# Patient Record
Sex: FEMALE | Race: WHITE | Employment: OTHER | URBAN - METROPOLITAN AREA
[De-identification: names, ages, dates, MRNs, and addresses within clinical notes are randomized per-mention and may not be internally consistent; named-entity substitution may affect disease eponyms.]

---

## 2021-04-12 ENCOUNTER — OFFICE VISIT (OUTPATIENT)
Dept: PRIMARY CARE CLINIC | Age: 71
End: 2021-04-12
Payer: MEDICARE

## 2021-04-12 VITALS
SYSTOLIC BLOOD PRESSURE: 152 MMHG | HEART RATE: 78 BPM | HEIGHT: 61 IN | OXYGEN SATURATION: 96 % | WEIGHT: 141.6 LBS | DIASTOLIC BLOOD PRESSURE: 82 MMHG | BODY MASS INDEX: 26.73 KG/M2

## 2021-04-12 DIAGNOSIS — E78.5 HYPERLIPIDEMIA, UNSPECIFIED HYPERLIPIDEMIA TYPE: ICD-10-CM

## 2021-04-12 DIAGNOSIS — I10 ESSENTIAL HYPERTENSION: Primary | ICD-10-CM

## 2021-04-12 DIAGNOSIS — R73.03 PREDIABETES: ICD-10-CM

## 2021-04-12 DIAGNOSIS — N18.2 STAGE 2 CHRONIC KIDNEY DISEASE: ICD-10-CM

## 2021-04-12 DIAGNOSIS — Z12.31 ENCOUNTER FOR SCREENING MAMMOGRAM FOR MALIGNANT NEOPLASM OF BREAST: ICD-10-CM

## 2021-04-12 PROCEDURE — 99204 OFFICE O/P NEW MOD 45 MIN: CPT | Performed by: FAMILY MEDICINE

## 2021-04-12 RX ORDER — ASPIRIN 81 MG/1
81 TABLET ORAL DAILY
COMMUNITY

## 2021-04-12 RX ORDER — TRETINOIN 0.5 MG/G
CREAM TOPICAL NIGHTLY
COMMUNITY

## 2021-04-12 RX ORDER — AMPICILLIN TRIHYDRATE 250 MG
CAPSULE ORAL
COMMUNITY

## 2021-04-12 RX ORDER — LATANOPROST 50 UG/ML
1 SOLUTION/ DROPS OPHTHALMIC NIGHTLY
COMMUNITY

## 2021-04-12 RX ORDER — OLMESARTAN MEDOXOMIL 5 MG/1
5 TABLET ORAL DAILY
Qty: 30 TABLET | Refills: 5
Start: 2021-04-12

## 2021-04-12 RX ORDER — ROSUVASTATIN CALCIUM 40 MG/1
40 TABLET, COATED ORAL EVERY EVENING
COMMUNITY
End: 2022-01-03 | Stop reason: SDUPTHER

## 2021-04-12 RX ORDER — MULTIVIT-MIN/IRON/FOLIC ACID/K 18-600-40
CAPSULE ORAL
COMMUNITY

## 2021-04-12 RX ORDER — TRIAMCINOLONE ACETONIDE 55 UG/1
2 SPRAY, METERED NASAL DAILY
COMMUNITY

## 2021-04-12 SDOH — ECONOMIC STABILITY: TRANSPORTATION INSECURITY
IN THE PAST 12 MONTHS, HAS LACK OF TRANSPORTATION KEPT YOU FROM MEETINGS, WORK, OR FROM GETTING THINGS NEEDED FOR DAILY LIVING?: NO

## 2021-04-12 SDOH — ECONOMIC STABILITY: FOOD INSECURITY: WITHIN THE PAST 12 MONTHS, THE FOOD YOU BOUGHT JUST DIDN'T LAST AND YOU DIDN'T HAVE MONEY TO GET MORE.: NEVER TRUE

## 2021-04-12 SDOH — ECONOMIC STABILITY: TRANSPORTATION INSECURITY
IN THE PAST 12 MONTHS, HAS THE LACK OF TRANSPORTATION KEPT YOU FROM MEDICAL APPOINTMENTS OR FROM GETTING MEDICATIONS?: NO

## 2021-04-12 ASSESSMENT — ENCOUNTER SYMPTOMS
CHEST TIGHTNESS: 0
ABDOMINAL PAIN: 0
VOMITING: 0
SHORTNESS OF BREATH: 0
DIARRHEA: 0
SORE THROAT: 0
NAUSEA: 0
CONSTIPATION: 0

## 2021-04-12 ASSESSMENT — PATIENT HEALTH QUESTIONNAIRE - PHQ9
SUM OF ALL RESPONSES TO PHQ QUESTIONS 1-9: 0
SUM OF ALL RESPONSES TO PHQ9 QUESTIONS 1 & 2: 0

## 2021-04-12 NOTE — PROGRESS NOTES
[facility] PRIMARY CARE  [address]   [address]  [address] [address]  Dept: [phone]  Dept Fax: [phone]    Malathi David is a 72 y.o. female who presents today for her medical conditions/complaints as noted below. Malathi David is c/o of  Chief Complaint   Patient presents with    Establish Care         HPI:     HPI    Pt here to establish care with new pcp today. BP is elevated today, she states notes it has been elevated since her eye surgery on Thursday last week. She is on steroid eye drops and eye antibiotics as well. She is on olmesartan 2.5 mg but does note when bp elevated to take 5 mg per his notes. She has been taking only 2.5 mg. Shes sees Dr. Yamile Phipps for CKD . States her kidney function has been stable. She avoids nsaids. Eating healthy does her own cooking at home, avoids too much salt. Seen by urology- had cystoscopy. Has prolapse as well. She also has hyperlipidemia and is on a statin, doing well on current medication. No results found for: LABA1C          ( goal A1C is < 7)   No results found for: LABMICR  No results found for: LDLCHOLESTEROL, LDLCALC    (goal LDL is <100)   No results found for: AST, ALT, BUN  BP Readings from Last 3 Encounters:   04/12/21 (!) 152/82          (goal 120/80)    History reviewed. No pertinent past medical history. History reviewed. No pertinent surgical history.     Family History   Problem Relation Age of Onset    Heart Disease Mother     High Cholesterol Mother     Kidney Disease Father        Social History     Tobacco Use    Smoking status: Never Smoker    Smokeless tobacco: Never Used   Substance Use Topics    Alcohol use: Not on file      Current Outpatient Medications   Medication Sig Dispense Refill    rosuvastatin (CRESTOR) 40 MG tablet Take 40 mg by mouth every evening      latanoprost (XALATAN) 0.005 % ophthalmic solution 1 drop nightly      tretinoin (RETIN-A) 0.05 % cream Apply topically nightly Apply topically nightly.  aspirin 81 MG EC tablet Take 81 mg by mouth daily      Coenzyme Q10 (COQ10) 200 MG CAPS Take by mouth      triamcinolone (NASACORT ALLERGY 24HR) 55 MCG/ACT nasal inhaler 2 sprays by Each Nostril route daily      Cholecalciferol (VITAMIN D) 50 MCG (2000 UT) CAPS capsule Take by mouth      olmesartan (BENICAR) 5 MG tablet Take 1 tablet by mouth daily Take 1/2 if under 775 systolic 30 tablet 5     No current facility-administered medications for this visit. No Known Allergies    Health Maintenance   Topic Date Due    Potassium monitoring  Never done    Creatinine monitoring  Never done    Hepatitis C screen  Never done    Lipid screen  Never done    COVID-19 Vaccine (1) Never done    DTaP/Tdap/Td vaccine (1 - Tdap) Never done    Diabetes screen  Never done    Breast cancer screen  Never done    Shingles Vaccine (1 of 2) Never done    Colon cancer screen colonoscopy  Never done    DEXA (modify frequency per FRAX score)  Never done    Pneumococcal 65+ years Vaccine (1 of 1 - PPSV23) Never done   [employer] Visit (AWV)  Never done    Flu vaccine (Season Ended) 09/01/2021    Hepatitis A vaccine  Aged Out    Hepatitis B vaccine  Aged Out    Hib vaccine  Aged Out    Meningococcal (ACWY) vaccine  Aged Out       Subjective:      Review of Systems   Constitutional: Negative for chills and fever. HENT: Negative for sore throat. Respiratory: Negative for chest tightness and shortness of breath. Cardiovascular: Negative for chest pain and palpitations. Gastrointestinal: Negative for abdominal pain, constipation, diarrhea, nausea and vomiting. Genitourinary: Negative for dysuria. Neurological: Negative for headaches. Objective:     Physical Exam  Constitutional:       Appearance: She is well-developed. HENT:      Head: Normocephalic and atraumatic.       Right Ear: External ear normal.      Left Ear: External ear normal. Eyes:      Pupils: Pupils are equal, round, and reactive to light. Comments: Some erythema of R conjunctiva, had recent eye surgery   Neck:      Musculoskeletal: Neck supple. Cardiovascular:      Rate and Rhythm: Normal rate and regular rhythm. Heart sounds: Normal heart sounds. Pulmonary:      Effort: Pulmonary effort is normal.      Breath sounds: Normal breath sounds. Abdominal:      General: Bowel sounds are normal. There is no distension. Palpations: Abdomen is soft. Tenderness: There is no abdominal tenderness. Skin:     General: Skin is warm and dry. Neurological:      Mental Status: She is alert and oriented to person, place, and time. Psychiatric:         Behavior: Behavior normal.         Thought Content: Thought content normal.       BP (!) 152/82   Pulse 78   Ht 5' 1\" (1.549 m)   Wt 141 lb 9.6 oz (64.2 kg)   SpO2 96%   BMI 26.76 kg/m²     Assessment:      1. Essential hypertension  - recommend she take 5 mg total of her olmesartan since her BP has been running high instead of 2.5 mg. I recommend she monitor for one week and let me know if it is still high may need to add norvasc. 2. Hyperlipidemia, unspecified hyperlipidemia type  - continue statin. - Lipid Panel; Future    3. Prediabetes  - pt with hx of prediabetes, will check A1c. Continue health diet , stay active. - Hemoglobin A1C; Future    4. Encounter for screening mammogram for malignant neoplasm of breast  - MARY DIGITAL SCREEN W OR WO CAD BILATERAL; Future  - she is utd on cologuard, and also had DEXA scan, we will request records from previous PCP. 5. Stage 2 chronic kidney disease  - following with Dr. Mamta Sneed. Plan:      Return in about 3 months (around 7/12/2021) for follow up BP.     Orders Placed This Encounter   Procedures    MARY DIGITAL SCREEN W OR WO CAD BILATERAL     Standing Status:   Future     Standing Expiration Date:   6/12/2022     Order Specific Question: Reason for exam:     Answer:   screening    Lipid Panel     Standing Status:   Future     Standing Expiration Date:   4/12/2022     Order Specific Question:   Is Patient Fasting?/# of Hours     Answer:   10 hours    Hemoglobin A1C     Standing Status:   Future     Standing Expiration Date:   4/12/2022     Orders Placed This Encounter   Medications    olmesartan (BENICAR) 5 MG tablet     Sig: Take 1 tablet by mouth daily Take 1/2 if under 210 systolic     Dispense:  30 tablet     Refill:  5        Patient given educational materials - see patient instructions. Discussed use, benefit, and side effects of prescribed medications. All patient questions answered. Pt voiced understanding. Reviewed healthmaintenance. Instructed to continue current medications, diet and exercise. Patient agreed with treatment plan. Follow up as directed.      Electronically signed by Grace Joe DO on 4/12/2021 at 9:52 AM

## 2021-04-28 DIAGNOSIS — Z12.31 ENCOUNTER FOR SCREENING MAMMOGRAM FOR MALIGNANT NEOPLASM OF BREAST: ICD-10-CM

## 2021-06-01 LAB
AVERAGE GLUCOSE: 160
CHOLESTEROL, TOTAL: 147 MG/DL
CHOLESTEROL/HDL RATIO: 2.9
HBA1C MFR BLD: 7.2 %
HDLC SERPL-MCNC: 50 MG/DL (ref 35–70)
LDL CHOLESTEROL CALCULATED: 59 MG/DL (ref 0–160)
NONHDLC SERPL-MCNC: NORMAL MG/DL
TRIGL SERPL-MCNC: 191 MG/DL
VITAMIN D 25-HYDROXY: 60.5
VITAMIN D2, 25 HYDROXY: NORMAL
VITAMIN D3,25 HYDROXY: NORMAL
VLDLC SERPL CALC-MCNC: 38 MG/DL

## 2021-06-02 DIAGNOSIS — E78.5 HYPERLIPIDEMIA, UNSPECIFIED HYPERLIPIDEMIA TYPE: ICD-10-CM

## 2021-06-03 ENCOUNTER — OFFICE VISIT (OUTPATIENT)
Dept: PRIMARY CARE CLINIC | Age: 71
End: 2021-06-03
Payer: MEDICARE

## 2021-06-03 VITALS
OXYGEN SATURATION: 97 % | SYSTOLIC BLOOD PRESSURE: 138 MMHG | HEART RATE: 64 BPM | WEIGHT: 140.6 LBS | DIASTOLIC BLOOD PRESSURE: 82 MMHG | HEIGHT: 61 IN | BODY MASS INDEX: 26.55 KG/M2

## 2021-06-03 DIAGNOSIS — Z01.818 PRE-OP EVALUATION: ICD-10-CM

## 2021-06-03 DIAGNOSIS — I10 ESSENTIAL HYPERTENSION: ICD-10-CM

## 2021-06-03 DIAGNOSIS — H26.9 CATARACT OF RIGHT EYE, UNSPECIFIED CATARACT TYPE: Primary | ICD-10-CM

## 2021-06-03 DIAGNOSIS — E78.5 HYPERLIPIDEMIA, UNSPECIFIED HYPERLIPIDEMIA TYPE: ICD-10-CM

## 2021-06-03 DIAGNOSIS — N18.2 STAGE 2 CHRONIC KIDNEY DISEASE: ICD-10-CM

## 2021-06-03 DIAGNOSIS — R73.03 PREDIABETES: ICD-10-CM

## 2021-06-03 PROCEDURE — 99214 OFFICE O/P EST MOD 30 MIN: CPT | Performed by: FAMILY MEDICINE

## 2021-06-03 ASSESSMENT — ENCOUNTER SYMPTOMS
VOMITING: 0
SHORTNESS OF BREATH: 0
NAUSEA: 0
CHEST TIGHTNESS: 0
SORE THROAT: 0

## 2021-06-03 NOTE — PROGRESS NOTES
[facility] PRIMARY CARE  [address]   [address]  [address] [address]  Dept: [phone]  Dept Fax: [phone]    Crescencio Prasad is a 72 y.o. female who presents today for her medical conditions/complaints as noted below. Crescencio Prasad is c/o of  Chief Complaint   Patient presents with    Other     preop         HPI:     HPI     Pt here today for preop clearance for her upcoming cataract surgery. Will be getting cataract surgery  Next week. Had retina surgery  few months ago, did well with procedure. BP usually well controlled, had few days of slightly elevated but resolved when she took 5mg of her olmesartan. She denies any chest pain or shortness of breath. Able to go up flight of stairs without chest pain or shortness of breath , able to walk 1-2 blocks as well without shortness of breath or chest pain. Following with urology for prolapse. Follows with Dr. Vikas Barnhart for CKD. Had recent labs. No results found for: LABA1C          ( goal A1C is < 7)   No results found for: LABMICRl. .  LDL Calculated (mg/dL)   Date Value   06/01/2021 59       (goal LDL is <100)   No results found for: AST, ALT, BUN  BP Readings from Last 3 Encounters:   06/03/21 138/82   04/12/21 (!) 152/82          (goal 120/80)    No past medical history on file. No past surgical history on file. Family History   Problem Relation Age of Onset    Heart Disease Mother     High Cholesterol Mother     Kidney Disease Father        Social History     Tobacco Use    Smoking status: Never Smoker    Smokeless tobacco: Never Used   Substance Use Topics    Alcohol use: Not on file      Current Outpatient Medications   Medication Sig Dispense Refill    rosuvastatin (CRESTOR) 40 MG tablet Take 40 mg by mouth every evening      latanoprost (XALATAN) 0.005 % ophthalmic solution 1 drop nightly      tretinoin (RETIN-A) 0.05 % cream Apply topically nightly Apply topically nightly.  aspirin 81 MG EC tablet Take 81 mg by mouth daily      Coenzyme Q10 (COQ10) 200 MG CAPS Take by mouth      triamcinolone (NASACORT ALLERGY 24HR) 55 MCG/ACT nasal inhaler 2 sprays by Each Nostril route daily      Cholecalciferol (VITAMIN D) 50 MCG (2000 UT) CAPS capsule Take by mouth      olmesartan (BENICAR) 5 MG tablet Take 1 tablet by mouth daily Take 1/2 if under 196 systolic 30 tablet 5     No current facility-administered medications for this visit. No Known Allergies    Health Maintenance   Topic Date Due    Potassium monitoring  Never done    Creatinine monitoring  Never done    Hepatitis C screen  Never done    COVID-19 Vaccine (1) Never done    DTaP/Tdap/Td vaccine (1 - Tdap) Never done    Diabetes screen  Never done    Shingles Vaccine (1 of 2) Never done    Colon cancer screen colonoscopy  Never done    DEXA (modify frequency per FRAX score)  Never done    Pneumococcal 65+ years Vaccine (1 of 1 - PPSV23) Never done   [employer] Visit (AWV)  Never done    Flu vaccine (Season Ended) 09/01/2021    Lipid screen  06/01/2022    Breast cancer screen  04/28/2023    Hepatitis A vaccine  Aged Out    Hepatitis B vaccine  Aged Out    Hib vaccine  Aged Out    Meningococcal (ACWY) vaccine  Aged Out       Subjective:      Review of Systems   Constitutional: Negative for appetite change, chills and fever. HENT: Negative for sore throat. Eyes: Negative for visual disturbance. Respiratory: Negative for chest tightness and shortness of breath. Cardiovascular: Negative for chest pain. Gastrointestinal: Negative for nausea and vomiting. Skin: Negative for rash. Objective:     Physical Exam  Constitutional:       Appearance: She is well-developed. HENT:      Head: Normocephalic and atraumatic. Mouth/Throat:      Mouth: Mucous membranes are moist.      Pharynx: Oropharynx is clear. No oropharyngeal exudate.    Eyes:      Conjunctiva/sclera: Conjunctivae normal. Pupils: Pupils are equal, round, and reactive to light. Cardiovascular:      Rate and Rhythm: Normal rate and regular rhythm. Heart sounds: Normal heart sounds. Pulmonary:      Effort: Pulmonary effort is normal.      Breath sounds: Normal breath sounds. Abdominal:      General: Bowel sounds are normal. There is no distension. Palpations: Abdomen is soft. Tenderness: There is no abdominal tenderness. Musculoskeletal:      Cervical back: Neck supple. Right lower leg: No edema. Left lower leg: No edema. Skin:     General: Skin is warm and dry. Neurological:      Mental Status: She is alert and oriented to person, place, and time. Psychiatric:         Mood and Affect: Mood normal.         Behavior: Behavior normal.         Thought Content: Thought content normal.       /82   Pulse 64   Ht 5' 1\" (1.549 m)   Wt 140 lb 9.6 oz (63.8 kg)   SpO2 97%   BMI 26.57 kg/m²     Assessment:      1. Cataract of right eye, unspecified cataract type  - will be getting cataract surgery R eye, may move forward with surgery without further testing needed. 2. Pre-op evaluation  - as noted above. 3. Essential hypertension  - BP better controlled compared to previous visit. Continue olmesartan. 4. Stage 2 chronic kidney disease  - following with Dr. Juanito Flores. 5. Prediabetes  - will check recent labs that she had done at [facility].    6. Hyperlipidemia, unspecified hyperlipidemia type  - continue statin. Plan:      Return in about 3 months (around 9/3/2021) for follow up. No orders of the defined types were placed in this encounter. No orders of the defined types were placed in this encounter. Patient given educational materials - see patient instructions. Discussed use, benefit, and side effects of prescribed medications. All patient questions answered. Pt voiced understanding. Reviewed healthmaintenance.   Instructed to continue current medications, diet and exercise. Patient agreed with treatment plan. Follow up as directed.      Electronically signed by Lazarus Loots, DO on 6/3/2021 at 11:57 AM

## 2021-06-04 DIAGNOSIS — R73.03 PREDIABETES: ICD-10-CM

## 2021-06-04 NOTE — RESULT ENCOUNTER NOTE
Please let pt know her total cholesterol is good, but her triglycerides are elevated. These are elevated because of her elevated glucose, this will improve with better glucose control. Her A1c is elevated at 7.2, now in the diabetic range. I recommend she start metformin for diabetes, and eat a healthy diet ( cut back on carbohydrates), she can find more information on the american diabetes association about healthy diet. If she is ok with starting medication it would be once a day with her breakfast. Let me know and I can send it to her pharmacy. Thanks.

## 2021-11-04 ENCOUNTER — HOSPITAL ENCOUNTER (OUTPATIENT)
Age: 71
Discharge: HOME | End: 2021-11-06
Payer: MEDICARE

## 2021-11-04 ENCOUNTER — HOSPITAL ENCOUNTER (OUTPATIENT)
Dept: GENERAL RADIOLOGY | Age: 71
Discharge: HOME | End: 2021-11-06
Payer: MEDICARE

## 2021-11-04 ENCOUNTER — OFFICE VISIT (OUTPATIENT)
Dept: PRIMARY CARE CLINIC | Age: 71
End: 2021-11-04
Payer: MEDICARE

## 2021-11-04 VITALS
DIASTOLIC BLOOD PRESSURE: 80 MMHG | OXYGEN SATURATION: 97 % | HEIGHT: 61 IN | WEIGHT: 138 LBS | HEART RATE: 69 BPM | SYSTOLIC BLOOD PRESSURE: 140 MMHG | BODY MASS INDEX: 26.06 KG/M2 | RESPIRATION RATE: 14 BRPM

## 2021-11-04 DIAGNOSIS — I10 ESSENTIAL HYPERTENSION: ICD-10-CM

## 2021-11-04 DIAGNOSIS — M25.511 ACUTE PAIN OF RIGHT SHOULDER: Primary | ICD-10-CM

## 2021-11-04 DIAGNOSIS — M25.511 ACUTE PAIN OF RIGHT SHOULDER: ICD-10-CM

## 2021-11-04 DIAGNOSIS — N18.2 STAGE 2 CHRONIC KIDNEY DISEASE: ICD-10-CM

## 2021-11-04 DIAGNOSIS — E11.9 TYPE 2 DIABETES MELLITUS WITHOUT COMPLICATION, WITHOUT LONG-TERM CURRENT USE OF INSULIN (HCC): ICD-10-CM

## 2021-11-04 PROCEDURE — 99214 OFFICE O/P EST MOD 30 MIN: CPT | Performed by: FAMILY MEDICINE

## 2021-11-04 PROCEDURE — 3051F HG A1C>EQUAL 7.0%<8.0%: CPT | Performed by: FAMILY MEDICINE

## 2021-11-04 PROCEDURE — 73030 X-RAY EXAM OF SHOULDER: CPT

## 2021-11-04 ASSESSMENT — PATIENT HEALTH QUESTIONNAIRE - PHQ9
SUM OF ALL RESPONSES TO PHQ9 QUESTIONS 1 & 2: 0
SUM OF ALL RESPONSES TO PHQ QUESTIONS 1-9: 0
1. LITTLE INTEREST OR PLEASURE IN DOING THINGS: 0
2. FEELING DOWN, DEPRESSED OR HOPELESS: 0

## 2021-11-04 ASSESSMENT — ENCOUNTER SYMPTOMS
VOMITING: 0
SHORTNESS OF BREATH: 0
NAUSEA: 0

## 2021-11-04 NOTE — PROGRESS NOTES
Never Smoker    Smokeless tobacco: Never Used   Substance Use Topics    Alcohol use: Not on file      Current Outpatient Medications   Medication Sig Dispense Refill    rosuvastatin (CRESTOR) 40 MG tablet Take 40 mg by mouth every evening      latanoprost (XALATAN) 0.005 % ophthalmic solution 1 drop nightly      tretinoin (RETIN-A) 0.05 % cream Apply topically nightly Apply topically nightly.  aspirin 81 MG EC tablet Take 81 mg by mouth daily      Coenzyme Q10 (COQ10) 200 MG CAPS Take by mouth      triamcinolone (NASACORT ALLERGY 24HR) 55 MCG/ACT nasal inhaler 2 sprays by Each Nostril route daily      Cholecalciferol (VITAMIN D) 50 MCG (2000 UT) CAPS capsule Take by mouth      olmesartan (BENICAR) 5 MG tablet Take 1 tablet by mouth daily Take 1/2 if under 546 systolic 30 tablet 5     No current facility-administered medications for this visit. No Known Allergies    Health Maintenance   Topic Date Due    Potassium monitoring  Never done    Creatinine monitoring  Never done    Hepatitis C screen  Never done    DTaP/Tdap/Td vaccine (1 - Tdap) Never done    DEXA (modify frequency per FRAX score)  Never done    Pneumococcal 65+ years Vaccine (2 of 2 - PPSV23) 06/28/2018    Annual Wellness Visit (AWV)  Never done    A1C test (Diabetic or Prediabetic)  09/01/2021    Flu vaccine (1) 09/01/2021    Lipid screen  06/01/2022    Colon cancer screen colonoscopy  11/08/2022    Breast cancer screen  04/28/2023    Shingles Vaccine  Completed    COVID-19 Vaccine  Completed    Hepatitis A vaccine  Aged Out    Hepatitis B vaccine  Aged Out    Hib vaccine  Aged Out    Meningococcal (ACWY) vaccine  Aged Out       Subjective:      Review of Systems   Constitutional: Negative for chills and fever. Respiratory: Negative for shortness of breath. Cardiovascular: Negative for chest pain. Gastrointestinal: Negative for nausea and vomiting. Musculoskeletal: Positive for arthralgias (shoulder pain). Objective:     Physical Exam  Constitutional:       Appearance: She is well-developed. HENT:      Head: Normocephalic and atraumatic. Right Ear: External ear normal.      Left Ear: External ear normal.   Eyes:      Conjunctiva/sclera: Conjunctivae normal.      Pupils: Pupils are equal, round, and reactive to light. Cardiovascular:      Rate and Rhythm: Normal rate and regular rhythm. Heart sounds: Normal heart sounds. Pulmonary:      Effort: Pulmonary effort is normal.      Breath sounds: Normal breath sounds. Musculoskeletal:         General: Tenderness present. Cervical back: Neck supple. Comments: R ttp anteriorly of shoulder, positive Gong sign . Pain with reaching back   Skin:     General: Skin is warm and dry. Neurological:      Mental Status: She is alert and oriented to person, place, and time. Psychiatric:         Mood and Affect: Mood normal.         Behavior: Behavior normal.         Thought Content: Thought content normal.       BP (!) 140/80   Pulse 69   Resp 14   Ht 5' 1\" (1.549 m)   Wt 138 lb (62.6 kg)   SpO2 97%   BMI 26.07 kg/m²     Assessment:      1. Acute pain of right shoulder  - recommend tylenol 1000 mg bid- tid prn .will check xray, consider PT  Depending on Xray results. - XR SHOULDER RIGHT (MIN 2 VIEWS); Future    2. Type 2 diabetes mellitus without complication, without long-term current use of insulin ([address]  - recommend healthy diet. declined medication previously. She has lost some weight as well. Pt will be following up with Dr. Mahogany Wilburn soon so I recommend we recheck her A1c when she gets labs done for him. - Hemoglobin A1C; Future    3. Essential hypertension  - BP slightly elevated , usually runs better at home. 4. Stage 2 chronic kidney disease  - follows with Dr. Ursula Caceres:      Return in about 6 months (around 5/4/2022) for medicare wellness.     Orders Placed This Encounter   Procedures    XR SHOULDER RIGHT (MIN 2 VIEWS)     Standing Status:   Future     Number of Occurrences:   1     Standing Expiration Date:   11/4/2022     Order Specific Question:   Reason for exam:     Answer:   R shoulder pain for past month    Hemoglobin A1C     Standing Status:   Future     Standing Expiration Date:   11/4/2022     No orders of the defined types were placed in this encounter. Patient given educational materials - see patient instructions. Discussed use, benefit, and side effects of prescribed medications. All patient questions answered. Pt voiced understanding. Reviewed healthmaintenance. Instructed to continue current medications, diet and exercise. Patient agreed with treatment plan. Follow up as directed.      Electronically signed by Valery Gibson DO on 11/4/2021 at 3:27 PM

## 2021-11-07 DIAGNOSIS — M25.511 ACUTE PAIN OF RIGHT SHOULDER: Primary | ICD-10-CM

## 2021-11-09 ENCOUNTER — TELEPHONE (OUTPATIENT)
Dept: PRIMARY CARE CLINIC | Age: 71
End: 2021-11-09

## 2022-01-03 RX ORDER — ROSUVASTATIN CALCIUM 40 MG/1
40 TABLET, COATED ORAL EVERY EVENING
Qty: 90 TABLET | Refills: 1 | Status: SHIPPED | OUTPATIENT
Start: 2022-01-03 | End: 2022-06-30

## 2022-01-05 LAB
AVERAGE GLUCOSE: 157
CREATININE, URINE: 117.44
HBA1C MFR BLD: 7.1 %
MICROALBUMIN/CREAT 24H UR: 0.4 MG/G{CREAT}
MICROALBUMIN/CREAT UR-RTO: 3.4
PTH INTACT: 45

## 2022-02-07 ENCOUNTER — TELEPHONE (OUTPATIENT)
Dept: PRIMARY CARE CLINIC | Age: 72
End: 2022-02-07

## 2022-02-07 RX ORDER — AMOXICILLIN AND CLAVULANATE POTASSIUM 875; 125 MG/1; MG/1
1 TABLET, FILM COATED ORAL 2 TIMES DAILY
Qty: 14 TABLET | Refills: 0 | Status: SHIPPED | OUTPATIENT
Start: 2022-02-07 | End: 2022-02-14

## 2022-02-07 NOTE — TELEPHONE ENCOUNTER
Could you ask how long she has been sick for? I would recommend considering covid testing due to the rise in cases past few weeks and similar symptoms. Let me know thanks!

## 2022-02-07 NOTE — TELEPHONE ENCOUNTER
----- Message from [facility] sent at 2/7/2022 12:55 PM EST -----  Subject: Message to Provider    QUESTIONS  Information for Provider? Pt c/o sinus infection, sore throat, drainage,   face pressure, hoarseness. Orvillecarly Klein. Pt states she's gotten   Augmentin in the past.  ---------------------------------------------------------------------------  --------------  CALL BACK INFO  What is the best way for the office to contact you? OK to leave message on   voicemail  Preferred Call Back Phone Number? [phone]  ---------------------------------------------------------------------------  --------------  SCRIPT ANSWERS  Relationship to Patient?  Self

## 2022-02-28 ENCOUNTER — TELEPHONE (OUTPATIENT)
Dept: PRIMARY CARE CLINIC | Age: 72
End: 2022-02-28

## 2022-02-28 DIAGNOSIS — N20.0 KIDNEY STONES: Primary | ICD-10-CM

## 2022-03-02 ENCOUNTER — OFFICE VISIT (OUTPATIENT)
Dept: PRIMARY CARE CLINIC | Age: 72
End: 2022-03-02
Payer: MEDICARE

## 2022-03-02 VITALS
BODY MASS INDEX: 26.45 KG/M2 | SYSTOLIC BLOOD PRESSURE: 130 MMHG | DIASTOLIC BLOOD PRESSURE: 82 MMHG | OXYGEN SATURATION: 97 % | HEART RATE: 63 BPM | WEIGHT: 140 LBS

## 2022-03-02 DIAGNOSIS — I10 ESSENTIAL HYPERTENSION: ICD-10-CM

## 2022-03-02 DIAGNOSIS — N20.0 KIDNEY STONES: Primary | ICD-10-CM

## 2022-03-02 DIAGNOSIS — N81.4 UTEROVAGINAL PROLAPSE: ICD-10-CM

## 2022-03-02 DIAGNOSIS — E11.9 TYPE 2 DIABETES MELLITUS WITHOUT COMPLICATION, WITHOUT LONG-TERM CURRENT USE OF INSULIN (HCC): ICD-10-CM

## 2022-03-02 PROCEDURE — 3051F HG A1C>EQUAL 7.0%<8.0%: CPT | Performed by: FAMILY MEDICINE

## 2022-03-02 PROCEDURE — 99214 OFFICE O/P EST MOD 30 MIN: CPT | Performed by: FAMILY MEDICINE

## 2022-03-02 RX ORDER — TIMOLOL MALEATE 5 MG/ML
1 SOLUTION/ DROPS OPHTHALMIC 2 TIMES DAILY
COMMUNITY

## 2022-03-02 ASSESSMENT — ENCOUNTER SYMPTOMS
SHORTNESS OF BREATH: 0
DIARRHEA: 0
CONSTIPATION: 0
ABDOMINAL PAIN: 0
NAUSEA: 0
VOMITING: 0

## 2022-03-02 ASSESSMENT — PATIENT HEALTH QUESTIONNAIRE - PHQ9
1. LITTLE INTEREST OR PLEASURE IN DOING THINGS: 0
2. FEELING DOWN, DEPRESSED OR HOPELESS: 0
SUM OF ALL RESPONSES TO PHQ QUESTIONS 1-9: 0
SUM OF ALL RESPONSES TO PHQ QUESTIONS 1-9: 0
SUM OF ALL RESPONSES TO PHQ9 QUESTIONS 1 & 2: 0
SUM OF ALL RESPONSES TO PHQ QUESTIONS 1-9: 0
SUM OF ALL RESPONSES TO PHQ QUESTIONS 1-9: 0

## 2022-03-02 NOTE — PROGRESS NOTES
[address] PRIMARY CARE  [address] [facility]  [address] [address]  Dept: [phone]  Dept Fax: [phone]    Michelle Ramires is a 80 y.o. female who presents today for her medical conditions/complaints as noted below. Michelle Ramires is c/o of  Chief Complaint   Patient presents with    Follow[facility]Up from [facility], kidney stone pain, ED could not find any stones, has not had pain since         HPI:     HPI     Pt here for ER follow up    [facility]. States was unable to find comfortable position. Tried water, laying down and did not help. She went to ED and ct showed 2mm kidney stone, urine + for blood and leukocytes so was prescribed keflex. Pt had stones about 30-35 years ago. She is feeling better and denies any more pain. She has been taking keflex. Did note some joint pain the next day but it resolved and has small bump back of calf that was tender that is getting smaller and not tender anymore. Denies any leg swelling. She did take some aspirin for it, discussed can continue aspirin for few days. She also has uterine prolapse and would like to see specialist for this. She did see Dr. Ike Long previously who is a urologist.   She is also diabetic , diet controlled. Hemoglobin A1C (%)   Date Value   2022 7.1   2021 7.2             ( goal A1C is < 7)   No results found for: LABMICR  LDL Calculated (mg/dL)   Date Value   2021 59       (goal LDL is <100)   No results found for: AST, ALT, BUN  BP Readings from Last 3 Encounters:   22 130/82   21 (!) 140/80   21 138/82          (goal 120/80)    No past medical history on file. No past surgical history on file.     Family History   Problem Relation Age of Onset    Heart Disease Mother     High Cholesterol Mother     Kidney Disease Father        Social History     Tobacco Use    Smoking status: Never Smoker    chills and fever. Respiratory: Negative for shortness of breath. Cardiovascular: Negative for chest pain. Gastrointestinal: Negative for abdominal pain, constipation, diarrhea, nausea and vomiting. Genitourinary: Negative for dysuria. Objective:     Physical Exam  Constitutional:       Appearance: She is well-developed. HENT:      Head: Normocephalic and atraumatic. Right Ear: External ear normal.      Left Ear: External ear normal.   Eyes:      Conjunctiva/sclera: Conjunctivae normal.      Pupils: Pupils are equal, round, and reactive to light. Cardiovascular:      Rate and Rhythm: Normal rate and regular rhythm. Heart sounds: Normal heart sounds. Pulmonary:      Effort: Pulmonary effort is normal.      Breath sounds: Normal breath sounds. Musculoskeletal:      Cervical back: Neck supple. Skin:     General: Skin is warm and dry. Comments: Superficial bump felt back of calf that is small   Neurological:      Mental Status: She is alert and oriented to person, place, and time. Psychiatric:         Mood and Affect: Mood normal.         Behavior: Behavior normal.         Thought Content: Thought content normal.       /82   Pulse 63   Wt 140 lb (63.5 kg)   SpO2 97%   BMI 26.45 kg/m²     Assessment:      1. Kidney stones  -pt has seen Dr Zane Jernigan in past so recommend following up with him. Symptoms have resolved. She did have 2 mm kidney stone without any dilation/hydronephrosis. 2. Uterovaginal prolapse  - recommend seeing Dr Ren Barrera DO, Urogynecology, [place]    3. Type 2 diabetes mellitus without complication, without long-term current use of insulin ([address]  - recommend healthy diet, cut back on carbohydrates. Follow up 3 months to recheck. 4. Essential hypertension  - controlled.   - can take aspirin few days for the bump on her leg, possibly superficial.   - discussed if feels bump gets bigger or has swelling of leg to call for possible imagine. Plan:      Return in 3 months (on 6/2/2022) for Annual Wellness Visit. Orders Placed This Encounter   Procedures   Baljeet West DO, Urogynecology, [place]     Referral Priority:   Routine     Referral Type:   Eval and Treat     Referral Reason:   Specialty Services Required     Referred to Provider:   Charles Castro DO     Requested Specialty:   Urogynecology     Number of Visits Requested:   1     No orders of the defined types were placed in this encounter. Patient given educational materials - see patient instructions. Discussed use, benefit, and side effects of prescribed medications. All patient questions answered. Pt voiced understanding. Reviewed healthmaintenance. Instructed to continue current medications, diet and exercise. Patient agreed with treatment plan. Follow up as directed.      Electronically signed by Rene Casper DO on 3/2/2022 at 10:37 AM

## 2022-03-28 ENCOUNTER — TELEPHONE (OUTPATIENT)
Dept: PRIMARY CARE CLINIC | Age: 72
End: 2022-03-28

## 2022-03-28 ENCOUNTER — OFFICE VISIT (OUTPATIENT)
Dept: FAMILY MEDICINE CLINIC | Age: 72
End: 2022-03-28
Payer: MEDICARE

## 2022-03-28 VITALS
SYSTOLIC BLOOD PRESSURE: 118 MMHG | HEIGHT: 61 IN | OXYGEN SATURATION: 98 % | DIASTOLIC BLOOD PRESSURE: 71 MMHG | WEIGHT: 138 LBS | BODY MASS INDEX: 26.06 KG/M2 | TEMPERATURE: 97.5 F | HEART RATE: 71 BPM

## 2022-03-28 DIAGNOSIS — B96.89 ACUTE BACTERIAL SINUSITIS: Primary | ICD-10-CM

## 2022-03-28 DIAGNOSIS — J01.90 ACUTE BACTERIAL SINUSITIS: Primary | ICD-10-CM

## 2022-03-28 PROCEDURE — 99212 OFFICE O/P EST SF 10 MIN: CPT | Performed by: NURSE PRACTITIONER

## 2022-03-28 RX ORDER — AMOXICILLIN AND CLAVULANATE POTASSIUM 875; 125 MG/1; MG/1
1 TABLET, FILM COATED ORAL 2 TIMES DAILY
Qty: 20 TABLET | Refills: 0 | Status: SHIPPED
Start: 2022-03-28 | End: 2022-03-29 | Stop reason: SINTOL

## 2022-03-28 ASSESSMENT — ENCOUNTER SYMPTOMS
EYE PAIN: 0
CHEST TIGHTNESS: 0
SORE THROAT: 1
SINUS PAIN: 1
SHORTNESS OF BREATH: 0
VOICE CHANGE: 0
SINUS PRESSURE: 1
TROUBLE SWALLOWING: 0
VOMITING: 0
NAUSEA: 0
RHINORRHEA: 0
COUGH: 0

## 2022-03-28 NOTE — PATIENT INSTRUCTIONS
Patient Education        Sinusitis: Care Instructions  Your Care Instructions     Sinusitis is an infection of the lining of the sinus cavities in your head. Sinusitis often follows a cold. It causes pain and pressure in your head and face. In most cases, sinusitis gets better on its own in 1 to 2 weeks. But some mild symptoms may last for several weeks. Sometimes antibiotics are needed. Follow-up care is a key part of your treatment and safety. Be sure to make and go to all appointments, and call your doctor if you are having problems. It's also a good idea to know your test results and keep a list of the medicines you take. How can you care for yourself at home? · Take an over-the-counter pain medicine, such as acetaminophen (Tylenol), ibuprofen (Advil, Motrin), or naproxen (Aleve). Read and follow all instructions on the label. · If the doctor prescribed antibiotics, take them as directed. Do not stop taking them just because you feel better. You need to take the full course of antibiotics. · Be careful when taking over-the-counter cold or flu medicines and Tylenol at the same time. Many of these medicines have acetaminophen, which is Tylenol. Read the labels to make sure that you are not taking more than the recommended dose. Too much acetaminophen (Tylenol) can be harmful. · Breathe warm, moist air from a steamy shower, a hot bath, or a sink filled with hot water. Avoid cold, dry air. Using a humidifier in your home may help. Follow the directions for cleaning the machine. · Use saline (saltwater) nasal washes. This can help keep your nasal passages open and wash out mucus and bacteria. You can buy saline nose drops at a grocery store or drugstore. Or you can make your own at home by adding 1 teaspoon of salt and 1 teaspoon of baking soda to 2 cups of distilled water. If you make your own, fill a bulb syringe with the solution, insert the tip into your nostril, and squeeze gently.  Evette Rose your nose.  · Put a hot, wet towel or a warm gel pack on your face 3 or 4 times a day for 5 to 10 minutes each time. · Try a decongestant nasal spray like oxymetazoline (Afrin). Do not use it for more than 3 days in a row. Using it for more than 3 days can make your congestion worse. When should you call for help? Call your doctor now or seek immediate medical care if:    · You have new or worse swelling or redness in your face or around your eyes.     · You have a new or higher fever. Watch closely for changes in your health, and be sure to contact your doctor if:    · You have new or worse facial pain.     · The mucus from your nose becomes thicker (like pus) or has new blood in it.     · You are not getting better as expected. Where can you learn more? Go to https://Sport Street[employer].[facility] [facility]. org and sign in to your Playdate App account. Enter Q019 in the Telnexus box to learn more about \"Sinusitis: Care Instructions. \"     If you do not have an account, please click on the \"Sign Up Now\" link. Current as of: September 8, 2021               Content Version: 13.1  © 1077-8031 Healthwise, Incorporated. Care instructions adapted under license by [facility] ([facility]). If you have questions about a medical condition or this instruction, always ask your healthcare professional. [place] [address] any warranty or liability for your use of this information.

## 2022-03-28 NOTE — PROGRESS NOTES
[address] [facility]  [address] [place] [facility] [address]  [address] [address]  Dept: [phone]  Dept Fax: [phone]    Alba Hoskins is a 80 y.o. female who presents today for her medical conditions/complaints of   Chief Complaint   Patient presents with    Sinus Problem     pressure, drainage can not see if colored    Pharyngitis     ~ 3 days duration          HPI:     /71   Pulse 71   Temp 97.5 °F (36.4 °C) (Temporal)   Ht 5' 1\" (1.549 m)   Wt 138 lb (62.6 kg)   SpO2 98%   BMI 26.07 kg/m²       HPI  Pt presented to the clinic today with c/o congestion. This is a new problem. The current episode started 3 days ago. The problem has been unchanged since onset. Associated symptoms include: sinus pressure, sore throat, headache, post nasal drip . Pertinent negatives include: No fever, chills, cough, SOB, chest pain, abdominal pain, loss of taste, smell. Pt has tried Nasocort, tylenol with no improvement. Vaccinated for COVID:  YES  Exposure to COVID:  NO  History of sinus infections. No past medical history on file. No past surgical history on file. Family History   Problem Relation Age of Onset    Heart Disease Mother     High Cholesterol Mother     Kidney Disease Father        Social History     Tobacco Use    Smoking status: Never Smoker    Smokeless tobacco: Never Used   Substance Use Topics    Alcohol use: Not on file        Prior to Visit Medications    Medication Sig Taking?  Authorizing Provider   amoxicillin-clavulanate (AUGMENTIN) 875-125 MG per tablet Take 1 tablet by mouth 2 times daily for 10 days Yes VINCENZO Templeton CNP   timolol (TIMOPTIC) 0.5 % ophthalmic solution 1 drop 2 times daily  Historical Provider, MD   rosuvastatin (CRESTOR) 40 MG tablet Take 1 tablet by mouth every evening  Karen Neal DO   latanoprost (XALATAN) 0.005 % ophthalmic solution 1 drop nightly  Historical Provider, MD   tretinoin (RETIN-A) 0.05 % cream Apply topically nightly Apply topically nightly. Historical Provider, MD   aspirin 81 MG EC tablet Take 81 mg by mouth daily  Historical Provider, MD   Coenzyme Q10 (COQ10) 200 MG CAPS Take by mouth  Historical Provider, MD   triamcinolone (NASACORT ALLERGY 24HR) 55 MCG/ACT nasal inhaler 2 sprays by Each Nostril route daily  Historical Provider, MD   Cholecalciferol (VITAMIN D) 50 MCG (2000 UT) CAPS capsule Take by mouth  Historical Provider, MD   olmesartan (BENICAR) 5 MG tablet Take 1 tablet by mouth daily Take 1/2 if under 617 systolic  Karen Medhkour, DO       No Known Allergies      Subjective:      Review of Systems   Constitutional: Negative for chills and fever. HENT: Positive for congestion, postnasal drip, sinus pressure, sinus pain and sore throat. Negative for ear pain, rhinorrhea, trouble swallowing and voice change. Eyes: Negative for pain and visual disturbance. Respiratory: Negative for cough, chest tightness and shortness of breath. Cardiovascular: Negative for chest pain, palpitations and leg swelling. Gastrointestinal: Negative for nausea and vomiting. Genitourinary: Negative for decreased urine volume and difficulty urinating. Musculoskeletal: Negative for gait problem, myalgias and neck pain. Skin: Negative for pallor and rash. Neurological: Positive for headaches. Negative for weakness and light-headedness. Psychiatric/Behavioral: Negative for sleep disturbance. Objective:     Physical Exam  Vitals and nursing note reviewed. Constitutional:       General: She is not in acute distress. Appearance: Normal appearance. HENT:      Head: Normocephalic and atraumatic. Jaw: No trismus. Right Ear: Tympanic membrane and ear canal normal.      Left Ear: Tympanic membrane and ear canal normal.      Nose: Congestion present. Right Sinus: Maxillary sinus tenderness and frontal sinus tenderness present.       Left Sinus: Maxillary sinus tenderness and frontal sinus tenderness present. Mouth/Throat:      Lips: Pink. Mouth: Mucous membranes are moist.      Pharynx: Oropharynx is clear. Uvula midline. No oropharyngeal exudate. Eyes:      Extraocular Movements: Extraocular movements intact. Conjunctiva/sclera: Conjunctivae normal.   Cardiovascular:      Rate and Rhythm: Normal rate and regular rhythm. Pulses: Normal pulses. Pulmonary:      Effort: Pulmonary effort is normal. No tachypnea. Breath sounds: Normal breath sounds. Abdominal:      General: Bowel sounds are normal.      Palpations: Abdomen is soft. Musculoskeletal:         General: Normal range of motion. Cervical back: Normal range of motion and neck supple. Skin:     General: Skin is warm and dry. Capillary Refill: Capillary refill takes less than 2 seconds. Coloration: Skin is not pale. Neurological:      Mental Status: She is alert and oriented to person, place, and time. Coordination: Coordination normal.      Gait: Gait normal.   Psychiatric:         Mood and Affect: Mood normal.         Thought Content: Thought content normal.           MEDICAL DECISION MAKING Assessment/Plan:     Bassem Llamas was seen today for sinus problem and pharyngitis. Diagnoses and all orders for this visit:    Acute bacterial sinusitis  -     amoxicillin-clavulanate (AUGMENTIN) 875-125 MG per tablet; Take 1 tablet by mouth 2 times daily for 10 days        Results for orders placed or performed in visit on 01/06/22   PTH, Intact   Result Value Ref Range    Pth Intact 45    Microalbumin / Creatinine Urine Ratio   Result Value Ref Range    Microalbumin Creatinine Ratio 3.4     Microalb, Ur 0.4     Creatinine, Urine 117.44    Hemoglobin A1C   Result Value Ref Range    Hemoglobin A1C 7.1 %    AVERAGE GLUCOSE 157      Based on the duration and severity of the symptoms-- I will treat this as bacterial at this time.   Patient instructed to complete antibiotic prescription fully. Increase fluids. May use Motrin/Tylenol for fever/pain as directed on the bottle. Saline washes, salt water gargles and over the counter preparations if desired. Pt instructed to return if symptoms do not improve. Go to the ER for any emergent concern. Preventing the Spread of Coronavirus Disease 2019 in Homes and Residential Communities: For the most recent information go to: [employer].fi    Patient given educational materials - see patientinstructions. Discussed use, benefit, and side effects of prescribed medications. All patient questions answered. Pt verbalized understanding. Instructed to continue current medications, diet and exercise. Patient agreed with treatment plan. Follow up as directed.      Electronically signed by VINCENZO Sosa CNP on 3/28/2022 at 11:28 AM

## 2022-03-28 NOTE — TELEPHONE ENCOUNTER
Pt called c/o cough, ST x4 days. States she was given abx last mo for the same issue. Asking for a refill. Does need to be seen?

## 2022-03-29 DIAGNOSIS — J01.90 ACUTE BACTERIAL SINUSITIS: Primary | ICD-10-CM

## 2022-03-29 DIAGNOSIS — B96.89 ACUTE BACTERIAL SINUSITIS: Primary | ICD-10-CM

## 2022-03-29 RX ORDER — DOXYCYCLINE HYCLATE 100 MG
100 TABLET ORAL 2 TIMES DAILY
Qty: 14 TABLET | Refills: 0 | Status: SHIPPED | OUTPATIENT
Start: 2022-03-29 | End: 2022-04-05

## 2022-03-29 NOTE — TELEPHONE ENCOUNTER
I spoke with the patient she will try the OTC Probiotic with Augmentin and call us back if she does not get any relief.

## 2022-03-29 NOTE — PROGRESS NOTES
Pt states the Augmentin is causing too much diarrhea and is requesting different antibiotic to be sent to pharmacy.

## 2022-03-29 NOTE — TELEPHONE ENCOUNTER
I can send in a different antibiotic, but they all tend to cause some degree of diarrhea. She seemed to tolerate the Augmentin last time when Dr. Rahat Walter ordered it for her, but can try a different one. She can also take a probiotic that you purchase over the counter which can help. If the diarrhea continues please let me know. Thank you.

## 2022-03-29 NOTE — TELEPHONE ENCOUNTER
Pt called stated the Antibiotic that she is taking she is having side affects . Stated she has had severe diarrhea for about 3-4 hours . Pt stated she stopped taking the medication. Asking for different antibiotic to be sent in.  Please advise

## 2022-04-01 ENCOUNTER — OFFICE VISIT (OUTPATIENT)
Dept: FAMILY MEDICINE CLINIC | Age: 72
End: 2022-04-01
Payer: MEDICARE

## 2022-04-01 ENCOUNTER — TELEPHONE (OUTPATIENT)
Dept: PRIMARY CARE CLINIC | Age: 72
End: 2022-04-01

## 2022-04-01 VITALS
HEIGHT: 61 IN | BODY MASS INDEX: 26.06 KG/M2 | SYSTOLIC BLOOD PRESSURE: 161 MMHG | RESPIRATION RATE: 12 BRPM | WEIGHT: 138 LBS | DIASTOLIC BLOOD PRESSURE: 87 MMHG | HEART RATE: 70 BPM | OXYGEN SATURATION: 97 %

## 2022-04-01 DIAGNOSIS — Z01.30 BLOOD PRESSURE CHECK: ICD-10-CM

## 2022-04-01 DIAGNOSIS — B37.0 THRUSH, ORAL: Primary | ICD-10-CM

## 2022-04-01 PROCEDURE — 99213 OFFICE O/P EST LOW 20 MIN: CPT | Performed by: REGISTERED NURSE

## 2022-04-01 ASSESSMENT — ENCOUNTER SYMPTOMS
EYE DISCHARGE: 0
TROUBLE SWALLOWING: 0
EYES NEGATIVE: 1
GASTROINTESTINAL NEGATIVE: 1
NAUSEA: 0
DIARRHEA: 0
SINUS PAIN: 0
WHEEZING: 0
SHORTNESS OF BREATH: 0
SORE THROAT: 0
SINUS PRESSURE: 0
RESPIRATORY NEGATIVE: 1
VOMITING: 0

## 2022-04-01 NOTE — TELEPHONE ENCOUNTER
----- Message from [facility] sent at 4/1/2022  8:06 AM EDT -----  Subject: Message to Provider    QUESTIONS  Information for Provider? Patient started taking augmentin antibiotic as   of Monday. They had contacted about having issues with upset stomach from   the antibiotic and were told to take a probiotic with it as well. Patient   is now experiencing a very metallic mouth taste, and tongue is slightly   white. She did not take the antibiotic last night, and is looking for   clarity on how she should proceed. ---------------------------------------------------------------------------  --------------  Gene [employer]irene INFO  What is the best way for the office to contact you? OK to leave message on   voicemail  Preferred Call Back Phone Number? [phone]  ---------------------------------------------------------------------------  --------------  SCRIPT ANSWERS  Relationship to Patient?  Self

## 2022-04-01 NOTE — PATIENT INSTRUCTIONS
Patient Education        Candidiasis: Care Instructions  Your Care Instructions  Candidiasis (say \"pzq-jxq-RS-uh-margaux\") is a yeast infection. Yeast normally lives in your body. But it can cause problems if your body's defenses don'twork as they should. Some medicines can increase your chance of getting a yeast infection. These include antibiotics, steroids, and cancer drugs. And some diseases like AIDSand diabetes can make you more likely to get yeast infections. There are different types of yeast infections. Monik Casas is a yeast infection in the mouth. It usually occurs in people with weakimmune systems. It causes white patches inside the mouth and throat. Yeast infections of the skin usually occur in skin folds where the skin stays moist. They cause red, oozing patches on your skin. Babies can get these infections under the diaper. Peoplewho often wear gloves can get them on their hands. Many women get vaginal yeast infections. They are most common when women take antibiotics. These infections can cause the vagina to itch and burn. They also cause white discharge that looks likecottage cheese. In rare cases, yeast infects the blood. This can cause serious disease. This kind of infection is treated with medicine given through a needle into a vein(IV). After you start treatment, a yeast infection usually goes away quickly. But if your immune system is weak, the infection may come back. Tell your doctor ifyou get yeast infections often. Follow-up care is a key part of your treatment and safety. Be sure to make and go to all appointments, and call your doctor if you are having problems. It's also a good idea to know your test results and keep alist of the medicines you take. How can you care for yourself at home?  Take your medicines exactly as prescribed. Call your doctor if you think you are having a problem with your medicine.  Use antibiotics only as directed by your doctor.    Eat yogurt with live cultures. It has bacteria called lactobacillus. It may help prevent some types of yeast infections.  Keep your skin clean and dry. Put powder on moist places.  If you are using a cream or suppository to treat a vaginal yeast infection, don't use condoms or a diaphragm. Use a different type of birth control.  Eat a healthy diet and get regular exercise. This will help keep your immune system strong. When should you call for help? Watch closely for changes in your health, and be sure to contact your doctor if:     You do not get better as expected. Where can you learn more? Go to https://InvenQuerypepiceweb.healthm2fx. org and sign in to your [employer] account. Enter A874 in the [employer]ness box to learn more about \"Candidiasis: Care Instructions. \"     If you do not have an account, please click on the \"Sign Up Now\" link. Current as of: November 22, 2021               Content Version: 13.2  © 2006-2022 Healthwise, [facility]. Care instructions adapted under license by [facility] ([facility]). If you have questions about a medical condition or this instruction, always ask your healthcare professional. [place] [address] any warranty or liability for your use of this information.

## 2022-04-01 NOTE — PROGRESS NOTES
[address] [facility]  [address] [facility] [address]  [address] [address]  Dept: [phone]  Dept Fax: [phone]    Sheilla Dakins is a 80 y.o. female who presents today for her medical conditions/complaints of   Chief Complaint   Patient presents with    Other     Metalic taste , white tongue     Follow-up     Pt was here on Monday           HPI:     BP (!) 161/87 (Site: Right Upper Arm, Position: Sitting, Cuff Size: Medium Adult)   Pulse 70   Resp 12   Ht 5' 1\" (1.549 m)   Wt 138 lb (62.6 kg) Comment: Pt gave weight  SpO2 97%   Breastfeeding No   BMI 26.07 kg/m²       HPI  Patient was placed on antibiotics for a sinus infection on Monday (5 days ago). She was started on Augmentin and then switched to doxy due to diarrhea. She started a pro-biotic and she reported her diarrhea subsided. Patient developed a white tongue and a metallic taste in her mouth for the past two days. She states her sinus pain has subsided. Denies any N/V/D or any vaginal discharge. No past medical history on file. No past surgical history on file. Family History   Problem Relation Age of Onset    Heart Disease Mother     High Cholesterol Mother     Kidney Disease Father        Social History     Tobacco Use    Smoking status: Never Smoker    Smokeless tobacco: Never Used   Substance Use Topics    Alcohol use: Not on file        Prior to Visit Medications    Medication Sig Taking?  Authorizing Provider   nystatin (MYCOSTATIN) 180275 UNIT/ML suspension Take 5 mLs by mouth 4 times daily for 10 days May swish for several minutes and swallow Yes VINCENZO Arguello CNP   doxycycline hyclate (VIBRA-TABS) 100 MG tablet Take 1 tablet by mouth 2 times daily for 7 days Yes VINCENZO Morel CNP   timolol (TIMOPTIC) 0.5 % ophthalmic solution 1 drop 2 times daily Yes Historical Provider, MD   rosuvastatin (CRESTOR) 40 MG tablet Take 1 tablet by mouth every evening Yes Karen Neal DO   latanoprost (XALATAN) 0.005 % ophthalmic solution 1 drop nightly Yes Historical Provider, MD   tretinoin (RETIN-A) 0.05 % cream Apply topically nightly Apply topically nightly. Yes Historical Provider, MD   aspirin 81 MG EC tablet Take 81 mg by mouth daily Yes Historical Provider, MD   Coenzyme Q10 (COQ10) 200 MG CAPS Take by mouth Yes Historical Provider, MD   triamcinolone (NASACORT ALLERGY 24HR) 55 MCG/ACT nasal inhaler 2 sprays by Each Nostril route daily Yes Historical Provider, MD   Cholecalciferol (VITAMIN D) 50 MCG (2000 UT) CAPS capsule Take by mouth Yes Historical Provider, MD   olmesartan (BENICAR) 5 MG tablet Take 1 tablet by mouth daily Take 1/2 if under 118 systolic Yes Karen Neal DO       No Known Allergies      Subjective:      Review of Systems   Constitutional: Negative. HENT: Negative for postnasal drip, sinus pressure, sinus pain, sneezing, sore throat and trouble swallowing. White tongue    Eyes: Negative. Negative for discharge. Respiratory: Negative. Negative for shortness of breath and wheezing. Cardiovascular: Negative. Negative for chest pain. Gastrointestinal: Negative. Negative for diarrhea, nausea and vomiting. Genitourinary: Negative. Negative for difficulty urinating. Musculoskeletal: Negative. Neurological: Negative. Psychiatric/Behavioral: Negative. Objective:     Physical Exam  Constitutional:       Appearance: She is normal weight. HENT:      Head: Normocephalic. Nose: Nose normal.      Right Sinus: No maxillary sinus tenderness or frontal sinus tenderness. Left Sinus: No maxillary sinus tenderness or frontal sinus tenderness. Mouth/Throat:      Lips: Pink. Mouth: Mucous membranes are moist. No lacerations or angioedema. Dentition: Normal dentition. Pharynx: Oropharynx is clear. No uvula swelling. Tonsils: No tonsillar abscesses.         Comments: Fredi Lucas noted to the tongue. Localized to tongue only. Eyes:      Conjunctiva/sclera: Conjunctivae normal.   Cardiovascular:      Rate and Rhythm: Normal rate and regular rhythm. Heart sounds: Normal heart sounds. Pulmonary:      Breath sounds: Normal breath sounds. Abdominal:      General: Abdomen is flat. Bowel sounds are normal.      Tenderness: There is no guarding. Genitourinary:     Vagina: No vaginal discharge (per patient ). Musculoskeletal:         General: Normal range of motion. Cervical back: Normal range of motion. Skin:     General: Skin is warm. Capillary Refill: Capillary refill takes less than 2 seconds. Neurological:      General: No focal deficit present. Mental Status: She is alert. Psychiatric:         Mood and Affect: Mood normal.           MEDICAL DECISION MAKING Assessment/Plan:     Take the nystatin as directed. Patient given information about thrush on her AVS. Hold the antibiotics. Patient states her sinusitis has greatly improved after 5 days of antibiotic therapy. Explained that thrush is not contagious. She should report any changes in her symptoms. Patient verbalized understanding and denies further questions. Patient was hypertensive during her exam. She states she has a home BP cuff and monitors her BP regularly. Today she took a half a pill of her antihypertensive med. She may take up to one full pill. Explained with her BP elevated she should take the full dose of her medication. She denies chest pain, visual changes or shortness of breath. Pomeroy So was seen today for other and follow-up. Diagnoses and all orders for this visit:    Thrush, oral  -     Discontinue: nystatin (MYCOSTATIN) 061863 UNIT/ML suspension; Take 5 mLs by mouth 4 times daily for 10 days  -     Discontinue: nystatin (MYCOSTATIN) 275066 UNIT/ML suspension; Take 5 mLs by mouth 4 times daily for 10 days  -     nystatin (MYCOSTATIN) 524906 UNIT/ML suspension;  Take 5 mLs by mouth 4 times daily for 10 days May swish for several minutes and swallow    Blood pressure check        Results for orders placed or performed in visit on 01/06/22   PTH, Intact   Result Value Ref Range    Pth Intact 45    Microalbumin / Creatinine Urine Ratio   Result Value Ref Range    Microalbumin Creatinine Ratio 3.4     Microalb, Ur 0.4     Creatinine, Urine 117.44    Hemoglobin A1C   Result Value Ref Range    Hemoglobin A1C 7.1 %    AVERAGE GLUCOSE 157        Patient counseled:     Patient given educational materials - see patientinstructions. Discussed use, benefit, and side effects of prescribed medications. All patient questions answered. Pt verbalized understanding. Instructed to continue current medications, diet and exercise. Patient agreed with treatment plan. Follow up as directed.      Electronically signed by VINCENZO Wiggins CNP on 4/1/2022 at 6:15 PM

## 2022-04-08 ENCOUNTER — TELEPHONE (OUTPATIENT)
Dept: PRIMARY CARE CLINIC | Age: 72
End: 2022-04-08

## 2022-04-08 RX ORDER — CLOTRIMAZOLE 10 MG/1
10 LOZENGE ORAL; TOPICAL
Qty: 70 TABLET | Refills: 0 | Status: SHIPPED | OUTPATIENT
Start: 2022-04-08 | End: 2022-04-22

## 2022-04-08 NOTE — TELEPHONE ENCOUNTER
Patient called stating that she has been taking Nystatin for about a week now and has no improvement in her Thrush. She is asking if a different medication can please be sent in.

## 2022-04-08 NOTE — TELEPHONE ENCOUNTER
Please let her know I have sent in clotrimazole lozenges, she should let them dissolve in her mouth about 15 minutes , to use for two weeks. If not improving to follow up. Event Note

## 2022-04-18 ENCOUNTER — TELEPHONE (OUTPATIENT)
Dept: PRIMARY CARE CLINIC | Age: 72
End: 2022-04-18

## 2022-04-18 DIAGNOSIS — Z12.31 ENCOUNTER FOR SCREENING MAMMOGRAM FOR MALIGNANT NEOPLASM OF BREAST: Primary | ICD-10-CM

## 2022-04-18 NOTE — TELEPHONE ENCOUNTER
Pt called in and is requesting an order for a mammogram. She will be having this done at [facility]. Writer will fax a copy of the order to [facility] and scan into chart.

## 2022-04-29 DIAGNOSIS — Z12.31 ENCOUNTER FOR SCREENING MAMMOGRAM FOR MALIGNANT NEOPLASM OF BREAST: ICD-10-CM

## 2022-06-30 RX ORDER — ROSUVASTATIN CALCIUM 40 MG/1
TABLET, COATED ORAL
Qty: 90 TABLET | Refills: 1 | Status: SHIPPED | OUTPATIENT
Start: 2022-06-30 | End: 2022-07-05 | Stop reason: SDUPTHER

## 2022-07-05 ENCOUNTER — OFFICE VISIT (OUTPATIENT)
Dept: PRIMARY CARE CLINIC | Age: 72
End: 2022-07-05
Payer: MEDICARE

## 2022-07-05 VITALS
HEART RATE: 67 BPM | WEIGHT: 141.2 LBS | DIASTOLIC BLOOD PRESSURE: 82 MMHG | HEIGHT: 61 IN | OXYGEN SATURATION: 97 % | BODY MASS INDEX: 26.66 KG/M2 | SYSTOLIC BLOOD PRESSURE: 132 MMHG

## 2022-07-05 DIAGNOSIS — E11.9 TYPE 2 DIABETES MELLITUS WITHOUT COMPLICATION, WITHOUT LONG-TERM CURRENT USE OF INSULIN (HCC): ICD-10-CM

## 2022-07-05 DIAGNOSIS — E78.5 HYPERLIPIDEMIA, UNSPECIFIED HYPERLIPIDEMIA TYPE: ICD-10-CM

## 2022-07-05 DIAGNOSIS — Z00.00 MEDICARE ANNUAL WELLNESS VISIT, SUBSEQUENT: Primary | ICD-10-CM

## 2022-07-05 LAB — HBA1C MFR BLD: 6.7 %

## 2022-07-05 PROCEDURE — G0439 PPPS, SUBSEQ VISIT: HCPCS | Performed by: FAMILY MEDICINE

## 2022-07-05 PROCEDURE — 3044F HG A1C LEVEL LT 7.0%: CPT | Performed by: FAMILY MEDICINE

## 2022-07-05 PROCEDURE — 83036 HEMOGLOBIN GLYCOSYLATED A1C: CPT | Performed by: FAMILY MEDICINE

## 2022-07-05 PROCEDURE — 1123F ACP DISCUSS/DSCN MKR DOCD: CPT | Performed by: FAMILY MEDICINE

## 2022-07-05 RX ORDER — ROSUVASTATIN CALCIUM 40 MG/1
TABLET, COATED ORAL
Qty: 90 TABLET | Refills: 1 | Status: SHIPPED | OUTPATIENT
Start: 2022-07-05

## 2022-07-05 SDOH — ECONOMIC STABILITY: FOOD INSECURITY: WITHIN THE PAST 12 MONTHS, YOU WORRIED THAT YOUR FOOD WOULD RUN OUT BEFORE YOU GOT MONEY TO BUY MORE.: NEVER TRUE

## 2022-07-05 SDOH — ECONOMIC STABILITY: FOOD INSECURITY: WITHIN THE PAST 12 MONTHS, THE FOOD YOU BOUGHT JUST DIDN'T LAST AND YOU DIDN'T HAVE MONEY TO GET MORE.: NEVER TRUE

## 2022-07-05 ASSESSMENT — PATIENT HEALTH QUESTIONNAIRE - PHQ9
SUM OF ALL RESPONSES TO PHQ9 QUESTIONS 1 & 2: 0
2. FEELING DOWN, DEPRESSED OR HOPELESS: 0
SUM OF ALL RESPONSES TO PHQ QUESTIONS 1-9: 0
1. LITTLE INTEREST OR PLEASURE IN DOING THINGS: 0
SUM OF ALL RESPONSES TO PHQ QUESTIONS 1-9: 0

## 2022-07-05 ASSESSMENT — LIFESTYLE VARIABLES: HOW OFTEN DO YOU HAVE A DRINK CONTAINING ALCOHOL: NEVER

## 2022-07-05 ASSESSMENT — SOCIAL DETERMINANTS OF HEALTH (SDOH): HOW HARD IS IT FOR YOU TO PAY FOR THE VERY BASICS LIKE FOOD, HOUSING, MEDICAL CARE, AND HEATING?: NOT HARD AT ALL

## 2022-07-05 NOTE — PROGRESS NOTES
Medicare Annual Wellness Visit    Cleo Mazariegos is here for Medicare AWV    Assessment & Plan   Medicare annual wellness visit, subsequent  Type 2 diabetes mellitus without complication, without long-term current use of insulin (HCC)  -     POCT glycosylated hemoglobin (Hb A1C)  - a1c improved to6.7, continue healthy diet  Hyperlipidemia, unspecified hyperlipidemia type  -     Lipid Panel; Future  -     ALT; Future  -     AST; Future      Recommendations for Preventive Services Due: see orders and patient instructions/AVS.  Recommended screening schedule for the next 5-10 years is provided to the patient in written form: see Patient Instructions/AVS.     Return in 4 months (on 11/5/2022) for diabetes follow up. Subjective     Following with ophthalmology, has drain for her glaucoma. Has helped with the pressures. Will be seeing Dr. Ronak Stern for bladder testing for uterine prolapse. Follows with Dr. Phyllis Goltz nephrology. Patient's complete Health Risk Assessment and screening values have been reviewed and are found in Flowsheets. The following problems were reviewed today and where indicated follow up appointments were made and/or referrals ordered. Positive Risk Factor Screenings with Interventions:               General Health and ACP:  General  In general, how would you say your health is?: Good  In the past 7 days, have you experienced any of the following: New or Increased Pain, New or Increased Fatigue, Loneliness, Social Isolation, Stress or Anger?: No  Do you get the social and emotional support that you need?: Yes  Do you have a Living Will?: Yes    Advance Directives     Power of  Living Will ACP-Advance Directive ACP-Power of     Not on File Not on File Not on File Not on File      General Health Risk Interventions: recommend bringing copy of living will.    ·      Hearing/Vision:  Do you or your family notice any trouble with your hearing that hasn't been managed with hearing aids?: No  Do you have difficulty driving, watching TV, or doing any of your daily activities because of your eyesight?: (!) Yes  Have you had an eye exam within the past year?: Yes  No exam data present    Hearing/Vision Interventions: had glaucoma drain put in eye.- has helped her eye pressure. Last eye surgery in may. Objective   Vitals:    07/05/22 1356   BP: 132/82   Pulse: 67   SpO2: 97%   Weight: 141 lb 3.2 oz (64 kg)   Height: 5' 0.5\" (1.537 m)      Body mass index is 27.12 kg/m². General Appearance: alert and oriented to person, place and time, well developed and well- nourished, in no acute distress  Skin: warm and dry, no rash or erythema  Head: normocephalic and atraumatic  Eyes: pupils equal, round, and reactive to light, extraocular eye movements intact, conjunctivae normal  Neck: supple   Pulmonary/Chest: clear to auscultation bilaterally- no wheezes, rales or rhonchi, normal air movement, no respiratory distress  Cardiovascular: normal rate, regular rhythm, normal S1 and S2, no murmurs,s  Neurologic: speech normal       No Known Allergies  Prior to Visit Medications    Medication Sig Taking? Authorizing Provider   rosuvastatin (CRESTOR) 40 MG tablet TAKE ONE TABLET BY MOUTH EVERY EVENING Yes Karen Medhkour, DO   timolol (TIMOPTIC) 0.5 % ophthalmic solution 1 drop 2 times daily  Historical Provider, MD   latanoprost (XALATAN) 0.005 % ophthalmic solution 1 drop nightly  Historical Provider, MD   tretinoin (RETIN-A) 0.05 % cream Apply topically nightly Apply topically nightly.   Historical Provider, MD   aspirin 81 MG EC tablet Take 81 mg by mouth daily  Historical Provider, MD   Coenzyme Q10 (COQ10) 200 MG CAPS Take by mouth  Historical Provider, MD   triamcinolone (NASACORT ALLERGY 24HR) 55 MCG/ACT nasal inhaler 2 sprays by Each Nostril route daily  Historical Provider, MD   Cholecalciferol (VITAMIN D) 50 MCG (2000 UT) CAPS capsule Take by mouth  Historical Provider, MD   olmesartan (BENICAR) 5 MG tablet Take 1 tablet by mouth daily Take 1/2 if under 305 systolic  DO Sherrie Shabazz (Including outside providers/suppliers regularly involved in providing care):   Patient Care Team:  Zackary Villalobos DO as PCP - General (Family Medicine)  Zackary Villalobos DO as PCP - [facility] Empaneled Provider     Reviewed and updated this visit:  Tobacco  Allergies  Meds  Med Hx  Surg Hx  Soc Hx  Fam Hx

## 2022-07-05 NOTE — PATIENT INSTRUCTIONS
Personalized Preventive Plan for David Simms - 7/5/2022  Medicare offers a range of preventive health benefits. Some of the tests and screenings are paid in full while other may be subject to a deductible, co-insurance, and/or copay. Some of these benefits include a comprehensive review of your medical history including lifestyle, illnesses that may run in your family, and various assessments and screenings as appropriate. After reviewing your medical record and screening and assessments performed today your provider may have ordered immunizations, labs, imaging, and/or referrals for you. A list of these orders (if applicable) as well as your Preventive Care list are included within your After Visit Summary for your review. Other Preventive Recommendations:    · A preventive eye exam performed by an eye specialist is recommended every 1-2 years to screen for glaucoma; cataracts, macular degeneration, and other eye disorders. · A preventive dental visit is recommended every 6 months. · Try to get at least 150 minutes of exercise per week or 10,000 steps per day on a pedometer . · Order or download the FREE \"Exercise & Physical Activity: Your Everyday Guide\" from [employer]. Call 5-125.816.5847 or search [employer] online. · You need 9345-4341 mg of calcium and 8264-0668 IU of vitamin D per day. It is possible to meet your calcium requirement with diet alone, but a vitamin D supplement is usually necessary to meet this goal.  · When exposed to the sun, use a sunscreen that protects against both UVA and UVB radiation with an SPF of 30 or greater. Reapply every 2 to 3 hours or after sweating, drying off with a towel, or swimming. · Always wear a seat belt when traveling in a car. Always wear a helmet when riding a bicycle or motorcycle.

## 2022-07-29 LAB
ALBUMIN SERPL-MCNC: 4.5 G/DL
ALP BLD-CCNC: NORMAL U/L
ALT SERPL-CCNC: NORMAL U/L
ANION GAP SERPL CALCULATED.3IONS-SCNC: 6 MMOL/L
AST SERPL-CCNC: NORMAL U/L
BASOPHILS ABSOLUTE: 0 /ΜL
BASOPHILS RELATIVE PERCENT: 1 %
BILIRUB SERPL-MCNC: NORMAL MG/DL
BILIRUBIN, URINE: NEGATIVE
BLOOD, URINE: NEGATIVE
BUN BLDV-MCNC: 21 MG/DL
CALCIUM SERPL-MCNC: 9.9 MG/DL
CHLORIDE BLD-SCNC: 105 MMOL/L
CLARITY: CLEAR
CO2: 26 MMOL/L
COLOR: YELLOW
CREAT SERPL-MCNC: 0.95 MG/DL
EOSINOPHILS ABSOLUTE: 0.2 /ΜL
EOSINOPHILS RELATIVE PERCENT: 2 %
GFR CALCULATED: 58
GLUCOSE BLD-MCNC: 131 MG/DL
GLUCOSE URINE: NEGATIVE
HCT VFR BLD CALC: 42.2 % (ref 36–46)
HEMOGLOBIN: 14.3 G/DL (ref 12–16)
KETONES, URINE: NEGATIVE
LEUKOCYTE ESTERASE, URINE: ABNORMAL
LYMPHOCYTES ABSOLUTE: 2.5 /ΜL
LYMPHOCYTES RELATIVE PERCENT: 34 %
MCH RBC QN AUTO: 29.7 PG
MCHC RBC AUTO-ENTMCNC: 34 G/DL
MCV RBC AUTO: 87 FL
MONOCYTES ABSOLUTE: 0.5 /ΜL
MONOCYTES RELATIVE PERCENT: 8 %
NEUTROPHILS ABSOLUTE: 3.9 /ΜL
NEUTROPHILS RELATIVE PERCENT: 55 %
NITRITE, URINE: NEGATIVE
PH UA: 5 (ref 4.5–8)
PLATELET # BLD: 365 K/ΜL
PMV BLD AUTO: 9.2 FL
POTASSIUM SERPL-SCNC: 4.5 MMOL/L
PROTEIN UA: ABNORMAL
PTH INTACT: 50.4
RBC # BLD: 4.83 10^6/ΜL
SODIUM BLD-SCNC: 137 MMOL/L
SPECIFIC GRAVITY, URINE: ABNORMAL
TOTAL PROTEIN: NORMAL
UROBILINOGEN, URINE: NORMAL
VITAMIN D 25-HYDROXY: 43.2
VITAMIN D2, 25 HYDROXY: NORMAL
VITAMIN D3,25 HYDROXY: NORMAL
WBC # BLD: 7.1 10^3/ML

## 2022-08-03 ENCOUNTER — TELEPHONE (OUTPATIENT)
Dept: PRIMARY CARE CLINIC | Age: 72
End: 2022-08-03

## 2022-08-20 ENCOUNTER — OFFICE VISIT (OUTPATIENT)
Dept: FAMILY MEDICINE CLINIC | Age: 72
End: 2022-08-20
Payer: MEDICARE

## 2022-08-20 VITALS
SYSTOLIC BLOOD PRESSURE: 150 MMHG | WEIGHT: 138 LBS | HEART RATE: 67 BPM | TEMPERATURE: 97.3 F | DIASTOLIC BLOOD PRESSURE: 75 MMHG | BODY MASS INDEX: 26.06 KG/M2 | HEIGHT: 61 IN | OXYGEN SATURATION: 98 %

## 2022-08-20 DIAGNOSIS — B96.89 ACUTE BACTERIAL SINUSITIS: Primary | ICD-10-CM

## 2022-08-20 DIAGNOSIS — J01.90 ACUTE BACTERIAL SINUSITIS: Primary | ICD-10-CM

## 2022-08-20 PROBLEM — E78.00 ELEVATED CHOLESTEROL: Status: ACTIVE | Noted: 2022-08-20

## 2022-08-20 PROCEDURE — 1123F ACP DISCUSS/DSCN MKR DOCD: CPT | Performed by: REGISTERED NURSE

## 2022-08-20 PROCEDURE — 99213 OFFICE O/P EST LOW 20 MIN: CPT | Performed by: REGISTERED NURSE

## 2022-08-20 RX ORDER — DOXYCYCLINE HYCLATE 100 MG/1
100 CAPSULE ORAL 2 TIMES DAILY
Qty: 14 CAPSULE | Refills: 0 | Status: SHIPPED | OUTPATIENT
Start: 2022-08-20 | End: 2022-08-27

## 2022-08-20 ASSESSMENT — ENCOUNTER SYMPTOMS
SORE THROAT: 1
VOICE CHANGE: 0
SINUS PAIN: 1
SINUS COMPLAINT: 1
EYE ITCHING: 0
TROUBLE SWALLOWING: 0
DIARRHEA: 0
NAUSEA: 0
SINUS PRESSURE: 1
SHORTNESS OF BREATH: 0
ABDOMINAL PAIN: 0
PHOTOPHOBIA: 0
COUGH: 0
VOMITING: 0
EYE DISCHARGE: 0
EYE PAIN: 0
EYES NEGATIVE: 1

## 2022-08-20 NOTE — PROGRESS NOTES
[address] [facility]  [address] [facility]  [address] [address]  Dept: [phone]  Dept Fax: [phone]    Conchis Tang is a 80 y.o. female who presents today for her medical conditions/complaints of   Chief Complaint   Patient presents with    Sinus Problem     C/o sinus congestion, drainage down throat, head ache off and on, sore throat ~ concerned for sinus infection ~ 2 days duration          HPI:     BP (!) 150/75 Comment: per patient she just took it at home  Pulse 67   Temp 97.3 °F (36.3 °C) (Temporal)   Ht 5' 0.5\" (1.537 m)   Wt 138 lb (62.6 kg)   SpO2 98%   BMI 26.51 kg/m²       Sinus Problem  This is a new problem. Episode onset: x 2 days ago. The problem has been rapidly worsening since onset. There has been no fever. The pain is moderate. Associated symptoms include headaches, sinus pressure and a sore throat. Pertinent negatives include no chills, congestion, coughing, ear pain or shortness of breath. Past treatments include spray decongestants and acetaminophen. The treatment provided no relief. Pain started 2 days ago, states her pain started to get better in her sinuses and then began to worsen significantly. Has a history of bacterial sinusitis in the past and states these symptoms feel similar. Patient states she was around her grand-daughter who had cold like symptoms. No known exposure to [email or web address] [address]. Has not taken any home COVID19 test.  She is not interested in a COVID19 test today. No past medical history on file. No past surgical history on file. Family History   Problem Relation Age of Onset    Heart Disease Mother     High Cholesterol Mother     Kidney Disease Father        Social History     Tobacco Use    Smoking status: Never    Smokeless tobacco: Never   Substance Use Topics    Alcohol use: Not on file        Prior to Visit Medications    Medication Sig Taking?  Authorizing Provider doxycycline hyclate (VIBRAMYCIN) 100 MG capsule Take 1 capsule by mouth 2 times daily for 7 days Yes Neena Yanes, APRN - CNP   rosuvastatin (CRESTOR) 40 MG tablet TAKE ONE TABLET BY MOUTH EVERY EVENING  Karen Medkour, DO   timolol (TIMOPTIC) 0.5 % ophthalmic solution 1 drop 2 times daily  Historical Provider, MD   latanoprost (XALATAN) 0.005 % ophthalmic solution 1 drop nightly  Historical Provider, MD   tretinoin (RETIN-A) 0.05 % cream Apply topically nightly Apply topically nightly. Historical Provider, MD   aspirin 81 MG EC tablet Take 81 mg by mouth daily  Historical Provider, MD   Coenzyme Q10 (COQ10) 200 MG CAPS Take by mouth  Historical Provider, MD   triamcinolone (NASACORT ALLERGY 24HR) 55 MCG/ACT nasal inhaler 2 sprays by Each Nostril route daily  Historical Provider, MD   Cholecalciferol (VITAMIN D) 50 MCG (2000 UT) CAPS capsule Take by mouth  Historical Provider, MD   olmesartan (BENICAR) 5 MG tablet Take 1 tablet by mouth daily Take 1/2 if under 681 systolic  Karen Medhkour, DO       No Known Allergies      Subjective:      Review of Systems   Constitutional:  Negative for chills. HENT:  Positive for sinus pressure, sinus pain and sore throat. Negative for congestion, dental problem, ear pain, hearing loss, mouth sores, trouble swallowing and voice change. Eyes: Negative. Negative for photophobia, pain, discharge, itching and visual disturbance. Respiratory:  Negative for cough and shortness of breath. Cardiovascular: Negative. Negative for chest pain and palpitations. Gastrointestinal:  Negative for abdominal pain, diarrhea, nausea and vomiting. Genitourinary: Negative. Negative for difficulty urinating and dysuria. Musculoskeletal: Negative. Skin: Negative. Neurological:  Positive for headaches. Psychiatric/Behavioral: Negative. Objective:     Physical Exam  Constitutional:       General: She is not in acute distress. Appearance: Normal appearance.  She is normal weight. She is not ill-appearing, toxic-appearing or diaphoretic. HENT:      Right Ear: Tympanic membrane, ear canal and external ear normal. There is no impacted cerumen. Left Ear: Tympanic membrane, ear canal and external ear normal. There is no impacted cerumen. Nose: No rhinorrhea. Right Sinus: Maxillary sinus tenderness and frontal sinus tenderness present. Left Sinus: Maxillary sinus tenderness and frontal sinus tenderness present. Mouth/Throat:      Mouth: Mucous membranes are moist.      Pharynx: Oropharynx is clear. No oropharyngeal exudate or posterior oropharyngeal erythema. Eyes:      Conjunctiva/sclera: Conjunctivae normal.   Cardiovascular:      Rate and Rhythm: Normal rate and regular rhythm. Heart sounds: Normal heart sounds. Pulmonary:      Effort: Pulmonary effort is normal. No respiratory distress. Breath sounds: Normal breath sounds. No wheezing or rales. Musculoskeletal:      Cervical back: No rigidity. Lymphadenopathy:      Cervical: No cervical adenopathy. Skin:     General: Skin is warm. Neurological:      General: No focal deficit present. Mental Status: She is alert. Psychiatric:         Mood and Affect: Mood normal.         MEDICAL DECISION MAKING Assessment/Plan:     Gladys Nair was seen today for sinus problem. Diagnoses and all orders for this visit:    Acute bacterial sinusitis  -     doxycycline hyclate (VIBRAMYCIN) 100 MG capsule; Take 1 capsule by mouth 2 times daily for 7 days    Patient's sinusitis began to lessen over the past day and now states they have rapidly worsened. Hx of past sinus infections. Explained she may wait 7-10 days before staring the antibiotic to see if symptoms clear on their own. May start the antibiotic if symptoms are worsening. Declines COVID19 test today, explained sinusitis may be a sign of COVID19 patient however would like to hold off on sending of COVID19 swab.    Patient instructed to complete antibiotic prescription fully once started. May use Motrin/Tylenol for fever/pain. Saline washes, salt water gargles and over the counter preparations if desired. Patient agreeable to treatment plan. Educational materials provided on AVS.  Follow up if symptoms do not improve/worsen. Results for orders placed or performed in visit on 08/02/22   Vitamin D 25 Hydroxy   Result Value Ref Range    Vit D, 25-Hydroxy 43.2     Vitamin D3,25 Hydroxy      Vitamin D2, 25 Hydroxy     PTH, Intact   Result Value Ref Range    Pth Intact 50.4    Urinalysis   Result Value Ref Range    pH, UA 5.0 4.5 - 8.0    Clarity, UA Clear Clear    Protein, UA 1+ (A) Negative    Glucose, Ur negative     Ketones, Urine Negative     Bilirubin, Urine Negative     Urobilinogen, Urine Normal     Nitrite, Urine Negative     Specific Gravity, Urine      Color, UA Yellow     Leukocyte Esterase, Urine Trace     Blood, Urine Negative    Comprehensive Metabolic Panel   Result Value Ref Range    Sodium 137 mmol/L    Chloride 105 mmol/L    Potassium 4.5 mmol/L    BUN 21 mg/dL    Creatinine 0.95     Glucose 131 mg/dL    AST      ALT      Calcium 9.9 mg/dL    Total Protein      CO2 26 mmol/L    Albumin 4.5     Alkaline Phosphatase      Total Bilirubin      Gfr Calculated 58     Anion Gap 6 mmol/L   CBC   Result Value Ref Range    WBC 7.1 10^3/mL    Hemoglobin 14.3 12.0 - 16.0 g/dL    Hematocrit 42.2 36 - 46 %    Platelets 026 K/µL    Neutrophils % 55 %    Lymphocytes % 34 %    Monocytes % 8 %    Eosinophils % 2 %    Basophils % 1 %    Neutrophils Absolute 3.9 /µL    Lymphocytes Absolute 2.5 /µL    Monocytes Absolute 0.5 /µL    Eosinophils Absolute 0.2 /µL    Basophils Absolute 0.0 /µL    RBC 4.83 10^6/µL    MCV 87 fL    MCH 29.7 pg    MCHC 34.0 g/dL    MPV 9.2 fL       Patient counseled:     Patient given educational materials - see patientinstructions. Discussed use, benefit, and side effects of prescribed medications.   All patient questions answered. Pt verbalized understanding. Instructed to continue current medications, diet and exercise. Patient agreed with treatment plan. Follow up as directed.      Electronically signed by VINCENZO Valdovinos CNP on 8/20/2022 at 11:13 AM

## 2022-09-07 ENCOUNTER — OFFICE VISIT (OUTPATIENT)
Dept: PRIMARY CARE CLINIC | Age: 72
End: 2022-09-07
Payer: MEDICARE

## 2022-09-07 VITALS
SYSTOLIC BLOOD PRESSURE: 128 MMHG | HEART RATE: 65 BPM | OXYGEN SATURATION: 98 % | DIASTOLIC BLOOD PRESSURE: 80 MMHG | WEIGHT: 138 LBS | BODY MASS INDEX: 26.51 KG/M2

## 2022-09-07 DIAGNOSIS — Z01.818 PRE-OP EXAM: ICD-10-CM

## 2022-09-07 DIAGNOSIS — E11.9 TYPE 2 DIABETES MELLITUS WITHOUT COMPLICATION, WITHOUT LONG-TERM CURRENT USE OF INSULIN (HCC): Primary | ICD-10-CM

## 2022-09-07 DIAGNOSIS — N18.30 STAGE 3 CHRONIC KIDNEY DISEASE, UNSPECIFIED WHETHER STAGE 3A OR 3B CKD (HCC): ICD-10-CM

## 2022-09-07 PROCEDURE — 3044F HG A1C LEVEL LT 7.0%: CPT | Performed by: FAMILY MEDICINE

## 2022-09-07 PROCEDURE — 99213 OFFICE O/P EST LOW 20 MIN: CPT | Performed by: FAMILY MEDICINE

## 2022-09-07 PROCEDURE — 1123F ACP DISCUSS/DSCN MKR DOCD: CPT | Performed by: FAMILY MEDICINE

## 2022-09-07 ASSESSMENT — PATIENT HEALTH QUESTIONNAIRE - PHQ9
1. LITTLE INTEREST OR PLEASURE IN DOING THINGS: 0
SUM OF ALL RESPONSES TO PHQ QUESTIONS 1-9: 0
SUM OF ALL RESPONSES TO PHQ QUESTIONS 1-9: 0
SUM OF ALL RESPONSES TO PHQ9 QUESTIONS 1 & 2: 0
SUM OF ALL RESPONSES TO PHQ QUESTIONS 1-9: 0
2. FEELING DOWN, DEPRESSED OR HOPELESS: 0
SUM OF ALL RESPONSES TO PHQ QUESTIONS 1-9: 0

## 2022-09-07 ASSESSMENT — ENCOUNTER SYMPTOMS
NAUSEA: 0
SHORTNESS OF BREATH: 0
VOMITING: 0

## 2022-09-07 NOTE — PROGRESS NOTES
24HR) 55 MCG/ACT nasal inhaler 2 sprays by Each Nostril route daily      Cholecalciferol (VITAMIN D) 50 MCG (2000 UT) CAPS capsule Take by mouth      olmesartan (BENICAR) 5 MG tablet Take 1 tablet by mouth daily Take 1/2 if under 665 systolic 30 tablet 5     No current facility-administered medications for this visit. No Known Allergies    Health Maintenance   Topic Date Due    Diabetic foot exam  Never done    Hepatitis C screen  Never done    DTaP/Tdap/Td vaccine (1 - Tdap) Never done    DEXA (modify frequency per FRAX score)  Never done    Pneumococcal 65+ years Vaccine (2 - PPSV23 or PCV20) 06/28/2018    Lipids  06/01/2022    Flu vaccine (1) 09/01/2022    Colorectal Cancer Screen  09/06/2022    Diabetic microalbuminuria test  01/05/2023    Diabetic retinal exam  04/13/2023    A1C test (Diabetic or Prediabetic)  07/05/2023    Depression Screen  07/05/2023    Annual Wellness Visit (AWV)  07/06/2023    Breast cancer screen  04/29/2024    Shingles vaccine  Completed    COVID-19 Vaccine  Completed    Hepatitis A vaccine  Aged Out    Hib vaccine  Aged Out    Meningococcal (ACWY) vaccine  Aged Out       Subjective:      Review of Systems   Constitutional:  Negative for chills and fever. Respiratory:  Negative for shortness of breath. Cardiovascular:  Negative for chest pain. Gastrointestinal:  Negative for nausea and vomiting. Objective:     Physical Exam  Constitutional:       Appearance: She is well-developed. HENT:      Head: Normocephalic and atraumatic. Right Ear: External ear normal.      Left Ear: External ear normal.   Eyes:      Conjunctiva/sclera: Conjunctivae normal.      Pupils: Pupils are equal, round, and reactive to light. Cardiovascular:      Rate and Rhythm: Normal rate and regular rhythm. Heart sounds: Normal heart sounds. Pulmonary:      Effort: Pulmonary effort is normal.      Breath sounds: Normal breath sounds.    Abdominal:      General: Bowel sounds are normal. There is no distension. Palpations: Abdomen is soft. Tenderness: There is no abdominal tenderness. Musculoskeletal:      Cervical back: Neck supple. Skin:     General: Skin is warm and dry. Neurological:      Mental Status: She is alert and oriented to person, place, and time. Psychiatric:         Mood and Affect: Mood normal.         Behavior: Behavior normal.         Thought Content: Thought content normal.     /80   Pulse 65   Wt 138 lb (62.6 kg)   SpO2 98%   BMI 26.51 kg/m²     Assessment:      1. Pre-op exam  - pt able to walk 1-2 flights of stairs and 1-2 blocks without chest pain or shortness of breath. Paperwork received for clearance with mention of labs to be done prior, recommend she contact Dr Lauren Bingham office regarding the testing. Pt may move forward with surgery as long as labs are reasonable. 2. Type 2 diabetes mellitus without complication, without long-term current use of insulin ([facility] [address] [address]  - continue healthy diet. 3. Stage 3 chronic kidney disease, unspecified whether stage 3a or 3b CKD ([facility] [address]  - follows with Dr. Hyun Rodríguez. Plan:      Return for diabetes follow up. No orders of the defined types were placed in this encounter. No orders of the defined types were placed in this encounter. Patient given educational materials - see patient instructions. Discussed use, benefit, and side effects of prescribed medications. All patient questions answered. Pt voiced understanding. Reviewed healthmaintenance. Instructed to continue current medications, diet and exercise. Patient agreed with treatment plan. Follow up as directed.      Electronically signed by Fortunato Emanuel DO on 9/7/2022 at 12:19 PM

## 2022-09-08 LAB
BASOPHILS ABSOLUTE: NORMAL
BASOPHILS RELATIVE PERCENT: NORMAL
BUN BLDV-MCNC: 19 MG/DL
CALCIUM SERPL-MCNC: 9.9 MG/DL
CHLORIDE BLD-SCNC: 104 MMOL/L
CO2: 27 MMOL/L
CREAT SERPL-MCNC: 0.98 MG/DL
EOSINOPHILS ABSOLUTE: NORMAL
EOSINOPHILS RELATIVE PERCENT: NORMAL
GFR CALCULATED: 56
GLUCOSE BLD-MCNC: 138 MG/DL
HCT VFR BLD CALC: 41.9 % (ref 36–46)
HEMOGLOBIN: 14 G/DL (ref 12–16)
LYMPHOCYTES ABSOLUTE: NORMAL
LYMPHOCYTES RELATIVE PERCENT: NORMAL
MCH RBC QN AUTO: 29.4 PG
MCHC RBC AUTO-ENTMCNC: 33.4 G/DL
MCV RBC AUTO: 88 FL
MONOCYTES ABSOLUTE: NORMAL
MONOCYTES RELATIVE PERCENT: NORMAL
NEUTROPHILS ABSOLUTE: NORMAL
NEUTROPHILS RELATIVE PERCENT: NORMAL
PLATELET # BLD: 380 K/ΜL
PMV BLD AUTO: 9.4 FL
POTASSIUM SERPL-SCNC: 3.9 MMOL/L
RBC # BLD: 4.77 10^6/ΜL
SODIUM BLD-SCNC: 139 MMOL/L
WBC # BLD: 8.4 10^3/ML

## 2022-09-12 ENCOUNTER — TELEPHONE (OUTPATIENT)
Dept: PRIMARY CARE CLINIC | Age: 72
End: 2022-09-12

## 2022-09-12 DIAGNOSIS — Z01.818 PRE-OP EXAM: ICD-10-CM

## 2022-09-12 DIAGNOSIS — R94.31 ABNORMAL EKG: Primary | ICD-10-CM

## 2022-10-03 ENCOUNTER — TELEPHONE (OUTPATIENT)
Dept: PRIMARY CARE CLINIC | Age: 72
End: 2022-10-03

## 2022-10-03 ENCOUNTER — OFFICE VISIT (OUTPATIENT)
Dept: PRIMARY CARE CLINIC | Age: 72
End: 2022-10-03
Payer: MEDICARE

## 2022-10-03 ENCOUNTER — HOSPITAL ENCOUNTER (OUTPATIENT)
Age: 72
Setting detail: SPECIMEN
Discharge: HOME | End: 2022-10-03

## 2022-10-03 VITALS
HEART RATE: 66 BPM | OXYGEN SATURATION: 98 % | BODY MASS INDEX: 25.74 KG/M2 | SYSTOLIC BLOOD PRESSURE: 130 MMHG | WEIGHT: 134 LBS | DIASTOLIC BLOOD PRESSURE: 80 MMHG

## 2022-10-03 DIAGNOSIS — R53.83 OTHER FATIGUE: ICD-10-CM

## 2022-10-03 DIAGNOSIS — R53.83 OTHER FATIGUE: Primary | ICD-10-CM

## 2022-10-03 LAB
ALBUMIN SERPL-MCNC: 4.2 G/DL (ref 3.5–5.2)
ALBUMIN/GLOBULIN RATIO: 1.3 (ref 1–2.5)
ALP BLD-CCNC: 107 U/L (ref 35–104)
ALT SERPL-CCNC: 224 U/L (ref 5–33)
ANION GAP SERPL CALCULATED.3IONS-SCNC: 12 MMOL/L (ref 9–17)
AST SERPL-CCNC: 153 U/L
BILIRUB SERPL-MCNC: 0.7 MG/DL (ref 0.3–1.2)
BUN BLDV-MCNC: 19 MG/DL (ref 8–23)
CALCIUM SERPL-MCNC: 9.9 MG/DL (ref 8.6–10.4)
CHLORIDE BLD-SCNC: 102 MMOL/L (ref 98–107)
CO2: 25 MMOL/L (ref 20–31)
CREAT SERPL-MCNC: 0.88 MG/DL (ref 0.5–0.9)
GFR SERPL CREATININE-BSD FRML MDRD: >60 ML/MIN/1.73M2
GLUCOSE BLD-MCNC: 107 MG/DL (ref 70–99)
POTASSIUM SERPL-SCNC: 4.8 MMOL/L (ref 3.7–5.3)
SODIUM BLD-SCNC: 139 MMOL/L (ref 135–144)
TOTAL PROTEIN: 7.4 G/DL (ref 6.4–8.3)
TSH SERPL DL<=0.05 MIU/L-ACNC: 3.56 UIU/ML (ref 0.3–5)

## 2022-10-03 PROCEDURE — 1123F ACP DISCUSS/DSCN MKR DOCD: CPT | Performed by: FAMILY MEDICINE

## 2022-10-03 PROCEDURE — 99213 OFFICE O/P EST LOW 20 MIN: CPT | Performed by: FAMILY MEDICINE

## 2022-10-03 ASSESSMENT — ENCOUNTER SYMPTOMS
CONSTIPATION: 1
SHORTNESS OF BREATH: 0
DIARRHEA: 1
VOMITING: 0

## 2022-10-03 NOTE — TELEPHONE ENCOUNTER
Call transferred from [facility] ([facility]) due to [address] word-    Patient advised she has been extremely fatigued for over a week. Had been experiencing diarrhea then constipation, which she advises has subsided. Also complaining of abdominal cramping. Patient is concerned about symptoms with upcoming hysterectomy scheduled. Appointment made for 10/03/2022 at 1pm with Dr Harjinder Prather.

## 2022-10-03 NOTE — PROGRESS NOTES
[address] PRIMARY CARE  [address] [facility] [address]  [address] [address]  Dept: [phone]  Dept Fax: [phone]    Estefania Doe is a 80 y.o. female who presents today for her medical conditions/complaints as noted below. Estefania Doe is c/o of  Chief Complaint   Patient presents with    Fatigue    Diarrhea     Stopped 1wk ago, having stomach cramps, then had constipation, has felt unwell this entire time, no stomach cramping today    Health Maintenance     Due for colonoscopy         HPI:     HPI    Pt here due to some fatigue/abdominal issues. Pt had stress test   almost 2 weeks ago and that night states was feeling very tired. The day after in the evening felt tired again. Thursday that week started to have diarrhea and abdominal cramping. Had diarrhea Thursday and Friday and then had constipation Saturday of last week. States didn't have bm for about a week. Has noted was trying to eat BRAT diet. Had normal BM past few days, and denies any abdominal cramping today just feeling tired. Starting to slowly feel better. Denies any dysuria or urinary frequency. Has hysterectomy scheduled for 10/12. Hemoglobin A1C (%)   Date Value   2022 6.7   2022 7.1   2021 7.2             ( goal A1C is < 7)   No results found for: LABMICR  LDL Calculated (mg/dL)   Date Value   2021 59       (goal LDL is <100)   BUN (mg/dL)   Date Value   2022 19     BP Readings from Last 3 Encounters:   10/03/22 130/80   22 128/80   22 (!) 150/75          (goal 120/80)    No past medical history on file. No past surgical history on file.     Family History   Problem Relation Age of Onset    Heart Disease Mother     High Cholesterol Mother     Kidney Disease Father        Social History     Tobacco Use    Smoking status: Never    Smokeless tobacco: Never   Substance Use Topics    Alcohol use: Not on file      Current Outpatient Medications Medication Sig Dispense Refill    rosuvastatin (CRESTOR) 40 MG tablet TAKE ONE TABLET BY MOUTH EVERY EVENING 90 tablet 1    timolol (TIMOPTIC) 0.5 % ophthalmic solution 1 drop 2 times daily      latanoprost (XALATAN) 0.005 % ophthalmic solution 1 drop nightly      tretinoin (RETIN-A) 0.05 % cream Apply topically nightly Apply topically nightly. aspirin 81 MG EC tablet Take 81 mg by mouth daily      Coenzyme Q10 (COQ10) 200 MG CAPS Take by mouth      triamcinolone (NASACORT ALLERGY 24HR) 55 MCG/ACT nasal inhaler 2 sprays by Each Nostril route daily      Cholecalciferol (VITAMIN D) 50 MCG (2000 UT) CAPS capsule Take by mouth      olmesartan (BENICAR) 5 MG tablet Take 1 tablet by mouth daily Take 1/2 if under 032 systolic 30 tablet 5     No current facility-administered medications for this visit. No Known Allergies    Health Maintenance   Topic Date Due    Diabetic foot exam  Never done    Hepatitis C screen  Never done    DTaP/Tdap/Td vaccine (1 - Tdap) Never done    DEXA (modify frequency per FRAX score)  Never done    Pneumococcal 65+ years Vaccine (2 - PPSV23 or PCV20) 06/28/2018    Lipids  06/01/2022    Flu vaccine (1) 08/01/2022    Colorectal Cancer Screen  09/06/2022    Diabetic retinal exam  04/13/2023    A1C test (Diabetic or Prediabetic)  07/05/2023    Annual Wellness Visit (AWV)  07/06/2023    Depression Screen  09/07/2023    Breast cancer screen  04/29/2024    Shingles vaccine  Completed    COVID-19 Vaccine  Completed    Hepatitis A vaccine  Aged Out    Hib vaccine  Aged Out    Meningococcal (ACWY) vaccine  Aged Out       Subjective:      Review of Systems   Constitutional:  Negative for chills and fever. Respiratory:  Negative for shortness of breath. Gastrointestinal:  Positive for constipation (had diarrhea and then constipation but now is having normal BM) and diarrhea. Negative for vomiting. Abdominal cramping improved   Genitourinary:  Negative for dysuria.      Objective: Physical Exam  Constitutional:       Appearance: She is well-developed. HENT:      Head: Normocephalic and atraumatic. Right Ear: External ear normal.      Left Ear: External ear normal.   Eyes:      Conjunctiva/sclera: Conjunctivae normal.      Pupils: Pupils are equal, round, and reactive to light. Cardiovascular:      Rate and Rhythm: Normal rate and regular rhythm. Heart sounds: Normal heart sounds. Pulmonary:      Effort: Pulmonary effort is normal.      Breath sounds: Normal breath sounds. Abdominal:      General: Bowel sounds are normal. There is no distension. Palpations: Abdomen is soft. Tenderness: There is no abdominal tenderness. Musculoskeletal:      Cervical back: Neck supple. Skin:     General: Skin is warm and dry. Neurological:      Mental Status: She is alert and oriented to person, place, and time. Psychiatric:         Mood and Affect: Mood normal.         Behavior: Behavior normal.         Thought Content: Thought content normal.     /80   Pulse 66   Wt 134 lb (60.8 kg)   SpO2 98%   BMI 25.74 kg/m²     Assessment:      1. Other fatigue  - discussed symptoms could have possibly viral or gastroenteritis. She is having normal BM now. Will check labs for her fatigue.   - TSH With Reflex Ft4; Future  - Comprehensive Metabolic Panel; Future  - CBC with Auto Differential; Future         Plan:      Return if symptoms worsen or fail to improve. Orders Placed This Encounter   Procedures    TSH With Reflex Ft4     Standing Status:   Future     Number of Occurrences:   1     Standing Expiration Date:   10/3/2023    Comprehensive Metabolic Panel     Standing Status:   Future     Number of Occurrences:   1     Standing Expiration Date:   10/3/2023    CBC with Auto Differential     Standing Status:   Future     Number of Occurrences:   1     Standing Expiration Date:   10/3/2023     No orders of the defined types were placed in this encounter.        Patient given educational materials - see patient instructions. Discussed use, benefit, and side effects of prescribed medications. All patient questions answered. Pt voiced understanding. Reviewed healthmaintenance. Instructed to continue current medications, diet and exercise. Patient agreed with treatment plan. Follow up as directed.      Electronically signed by Ledy Julien DO on 10/3/2022 at 2:10 PM

## 2022-10-04 DIAGNOSIS — R10.84 GENERALIZED ABDOMINAL PAIN: ICD-10-CM

## 2022-10-04 DIAGNOSIS — R74.8 ELEVATED LIVER ENZYMES: Primary | ICD-10-CM

## 2022-10-04 LAB
ABSOLUTE EOS #: 0.1 K/UL (ref 0–0.44)
ABSOLUTE IMMATURE GRANULOCYTE: 0.05 K/UL (ref 0–0.3)
ABSOLUTE LYMPH #: 2.44 K/UL (ref 1.1–3.7)
ABSOLUTE MONO #: 0.99 K/UL (ref 0.1–1.2)
BASOPHILS # BLD: 1 % (ref 0–2)
BASOPHILS ABSOLUTE: 0.05 K/UL (ref 0–0.2)
EOSINOPHILS RELATIVE PERCENT: 1 % (ref 1–4)
HCT VFR BLD CALC: 45.6 % (ref 36.3–47.1)
HEMOGLOBIN: 14.2 G/DL (ref 11.9–15.1)
IMMATURE GRANULOCYTES: 1 %
LYMPHOCYTES # BLD: 27 % (ref 24–43)
MCH RBC QN AUTO: 29.1 PG (ref 25.2–33.5)
MCHC RBC AUTO-ENTMCNC: 31.1 G/DL (ref 28.4–34.8)
MCV RBC AUTO: 93.4 FL (ref 82.6–102.9)
MONOCYTES # BLD: 11 % (ref 3–12)
NRBC AUTOMATED: 0.2 PER 100 WBC
PDW BLD-RTO: 13.5 % (ref 11.8–14.4)
PLATELET # BLD: 451 K/UL (ref 138–453)
PMV BLD AUTO: 11.4 FL (ref 8.1–13.5)
RBC # BLD: 4.88 M/UL (ref 3.95–5.11)
SEG NEUTROPHILS: 59 % (ref 36–65)
SEGMENTED NEUTROPHILS ABSOLUTE COUNT: 5.59 K/UL (ref 1.5–8.1)
WBC # BLD: 9.2 K/UL (ref 3.5–11.3)

## 2022-10-04 NOTE — RESULT ENCOUNTER NOTE
Please let pt know her liver enzymes are elevated. I would like to order labs for her elevated liver enzymes and a liver US.  I have placed the order please provide her with scheduling number for the US

## 2022-10-06 ENCOUNTER — HOSPITAL ENCOUNTER (OUTPATIENT)
Dept: ULTRASOUND IMAGING | Facility: CLINIC | Age: 72
Discharge: HOME | End: 2022-10-08
Payer: MEDICARE

## 2022-10-06 ENCOUNTER — HOSPITAL ENCOUNTER (OUTPATIENT)
Age: 72
Setting detail: SPECIMEN
Discharge: HOME | End: 2022-10-06

## 2022-10-06 DIAGNOSIS — R74.8 ELEVATED LIVER ENZYMES: ICD-10-CM

## 2022-10-06 DIAGNOSIS — R10.84 GENERALIZED ABDOMINAL PAIN: ICD-10-CM

## 2022-10-06 DIAGNOSIS — E78.5 HYPERLIPIDEMIA, UNSPECIFIED HYPERLIPIDEMIA TYPE: ICD-10-CM

## 2022-10-06 DIAGNOSIS — E11.9 TYPE 2 DIABETES MELLITUS WITHOUT COMPLICATION, WITHOUT LONG-TERM CURRENT USE OF INSULIN (HCC): ICD-10-CM

## 2022-10-06 LAB
ALT SERPL-CCNC: 182 U/L (ref 5–33)
AST SERPL-CCNC: 95 U/L
CHOLESTEROL/HDL RATIO: 2.6
CHOLESTEROL: 88 MG/DL
ESTIMATED AVERAGE GLUCOSE: 146 MG/DL
FERRITIN: 164 NG/ML (ref 13–150)
HAV IGM SER IA-ACNC: NONREACTIVE
HBA1C MFR BLD: 6.7 % (ref 4–6)
HDLC SERPL-MCNC: 34 MG/DL
HEPATITIS B CORE IGM ANTIBODY: NONREACTIVE
HEPATITIS B SURFACE ANTIGEN: NONREACTIVE
HEPATITIS C ANTIBODY: NONREACTIVE
IRON SATURATION: 26 % (ref 20–55)
IRON: 76 UG/DL (ref 37–145)
LDL CHOLESTEROL: 32 MG/DL (ref 0–130)
TOTAL IRON BINDING CAPACITY: 297 UG/DL (ref 250–450)
TRIGL SERPL-MCNC: 110 MG/DL
UNSATURATED IRON BINDING CAPACITY: 221 UG/DL (ref 112–347)

## 2022-10-06 PROCEDURE — 76705 ECHO EXAM OF ABDOMEN: CPT

## 2022-10-11 ENCOUNTER — TELEPHONE (OUTPATIENT)
Dept: PRIMARY CARE CLINIC | Age: 72
End: 2022-10-11

## 2022-10-11 NOTE — TELEPHONE ENCOUNTER
Ruma Levels from Dr. Zelalem Wilhelm is calling into the office. They state that patient has surgery scheduled tomorrow 10/12/22. Patient advised that she was recently having liver enzyme issues. They state that cardio has approved her to have surgery. But we would need to send a letter giving patient clearance for surgery. If PCP approves we can    FAX letter to [phone]    Ruma Levels would also like a call back, so they can proceed.

## 2022-10-11 NOTE — LETTER
[facility] Primary Care  [address]  [facility] [address]  Phone: [phone]  Fax: [phone] Tahmina Arora DO        October 11, 2022     Patient: Loren Wyatt   YOB: 1950           To Whom it May Concern:    Loren Wyatt may move forward with her surgery without further testing needed. If you have any questions or concerns, please don't hesitate to call.     Sincerely,         [place] [employer] [place], DO

## 2022-10-12 LAB — GLUCOSE BLD-MCNC: 68 MG/DL

## 2022-10-13 LAB
BASOPHILS ABSOLUTE: 0 /ΜL
BASOPHILS RELATIVE PERCENT: 0 %
BUN BLDV-MCNC: 13 MG/DL
CALCIUM SERPL-MCNC: NORMAL MG/DL
CHLORIDE BLD-SCNC: 104 MMOL/L
CO2: 24 MMOL/L
CREAT SERPL-MCNC: 0.84 MG/DL
EOSINOPHILS ABSOLUTE: 0 /ΜL
EOSINOPHILS RELATIVE PERCENT: 0 %
GFR CALCULATED: 67
GLUCOSE BLD-MCNC: NORMAL MG/DL
HCT VFR BLD CALC: 41.1 % (ref 36–46)
HEMOGLOBIN: 13.6 G/DL (ref 12–16)
LYMPHOCYTES ABSOLUTE: 1.3 /ΜL
LYMPHOCYTES RELATIVE PERCENT: 9 %
MCH RBC QN AUTO: 29.2 PG
MCHC RBC AUTO-ENTMCNC: 33 G/DL
MCV RBC AUTO: 88 FL
MONOCYTES ABSOLUTE: 1 /ΜL
MONOCYTES RELATIVE PERCENT: 7 %
NEUTROPHILS ABSOLUTE: 12.8 /ΜL
NEUTROPHILS RELATIVE PERCENT: 84 %
PLATELET # BLD: 398 K/ΜL
PMV BLD AUTO: 8.7 FL
POTASSIUM SERPL-SCNC: 4.2 MMOL/L
RBC # BLD: 4.65 10^6/ΜL
SODIUM BLD-SCNC: 136 MMOL/L
WBC # BLD: 15.2 10^3/ML

## 2022-11-07 ENCOUNTER — HOSPITAL ENCOUNTER (OUTPATIENT)
Age: 72
Setting detail: SPECIMEN
Discharge: HOME | End: 2022-11-07

## 2022-11-07 ENCOUNTER — OFFICE VISIT (OUTPATIENT)
Dept: PRIMARY CARE CLINIC | Age: 72
End: 2022-11-07
Payer: MEDICARE

## 2022-11-07 VITALS
WEIGHT: 133 LBS | OXYGEN SATURATION: 96 % | BODY MASS INDEX: 25.55 KG/M2 | HEART RATE: 78 BPM | DIASTOLIC BLOOD PRESSURE: 72 MMHG | SYSTOLIC BLOOD PRESSURE: 138 MMHG

## 2022-11-07 DIAGNOSIS — R74.8 ELEVATED LIVER ENZYMES: ICD-10-CM

## 2022-11-07 DIAGNOSIS — G47.00 INSOMNIA, UNSPECIFIED TYPE: ICD-10-CM

## 2022-11-07 DIAGNOSIS — Z23 NEED FOR PNEUMOCOCCAL VACCINE: ICD-10-CM

## 2022-11-07 DIAGNOSIS — E11.9 TYPE 2 DIABETES MELLITUS WITHOUT COMPLICATION, WITHOUT LONG-TERM CURRENT USE OF INSULIN (HCC): Primary | ICD-10-CM

## 2022-11-07 DIAGNOSIS — E78.00 ELEVATED CHOLESTEROL: ICD-10-CM

## 2022-11-07 PROCEDURE — G0009 ADMIN PNEUMOCOCCAL VACCINE: HCPCS | Performed by: FAMILY MEDICINE

## 2022-11-07 PROCEDURE — 99214 OFFICE O/P EST MOD 30 MIN: CPT | Performed by: FAMILY MEDICINE

## 2022-11-07 PROCEDURE — 1123F ACP DISCUSS/DSCN MKR DOCD: CPT | Performed by: FAMILY MEDICINE

## 2022-11-07 PROCEDURE — 3044F HG A1C LEVEL LT 7.0%: CPT | Performed by: FAMILY MEDICINE

## 2022-11-07 PROCEDURE — 90677 PCV20 VACCINE IM: CPT | Performed by: FAMILY MEDICINE

## 2022-11-07 ASSESSMENT — PATIENT HEALTH QUESTIONNAIRE - PHQ9
SUM OF ALL RESPONSES TO PHQ QUESTIONS 1-9: 0
2. FEELING DOWN, DEPRESSED OR HOPELESS: 0
SUM OF ALL RESPONSES TO PHQ9 QUESTIONS 1 & 2: 0
1. LITTLE INTEREST OR PLEASURE IN DOING THINGS: 0

## 2022-11-07 ASSESSMENT — ENCOUNTER SYMPTOMS
SHORTNESS OF BREATH: 0
VOMITING: 0

## 2022-11-07 NOTE — PROGRESS NOTES
[address] PRIMARY CARE  [address] [facility] 1560  [address] [address]  Dept: [phone]  Dept Fax: [phone]    Soto Cantu is a 80 y.o. female who presents today for her medical conditions/complaints as noted below. Soto Cantu is c/o of  Chief Complaint   Patient presents with    Diabetes     Too early for A1c    Results     Discuss lab results, had hyst last month         HPI:     HPI    Pt here for follow up on diabetes. A1c 6.7 on October. Had hysterectomy and states has recovered well from this. Does note she still does wake up few times at night , not necessarily to go to the bathroom but just wakes up like she used to prior to surgery. Takes some time to fall back asleep. She has been limiting fluids after dinner. Avoids using phone before bed and usually does some reading. Pt also had elevated liver enzymes  which have been down trending. Liver US was normal, hepatitis panel was neg. We will recheck  labs today. Hemoglobin A1C (%)   Date Value   10/06/2022 6.7 (H)   2022 6.7   2022 7.1             ( goal A1C is < 7)   No results found for: LABMICR  LDL Cholesterol (mg/dL)   Date Value   10/06/2022 32     LDL Calculated (mg/dL)   Date Value   2021 59       (goal LDL is <100)   AST (U/L)   Date Value   10/06/2022 95 (H)     ALT (U/L)   Date Value   10/06/2022 182 (H)     BUN (mg/dL)   Date Value   10/13/2022 13     BP Readings from Last 3 Encounters:   22 138/72   10/03/22 130/80   22 128/80          (goal 120/80)    No past medical history on file. No past surgical history on file.     Family History   Problem Relation Age of Onset    Heart Disease Mother     High Cholesterol Mother     Kidney Disease Father        Social History     Tobacco Use    Smoking status: Never    Smokeless tobacco: Never   Substance Use Topics    Alcohol use: Not on file      Current Outpatient Medications   Medication Sig Dispense Refill    rosuvastatin (CRESTOR) 40 MG tablet TAKE ONE TABLET BY MOUTH EVERY EVENING 90 tablet 1    timolol (TIMOPTIC) 0.5 % ophthalmic solution 1 drop 2 times daily      latanoprost (XALATAN) 0.005 % ophthalmic solution 1 drop nightly      tretinoin (RETIN-A) 0.05 % cream Apply topically nightly Apply topically nightly. aspirin 81 MG EC tablet Take 81 mg by mouth daily      Coenzyme Q10 (COQ10) 200 MG CAPS Take by mouth      triamcinolone (NASACORT) 55 MCG/ACT nasal inhaler 2 sprays by Each Nostril route daily      Cholecalciferol (VITAMIN D) 50 MCG (2000 UT) CAPS capsule Take by mouth      olmesartan (BENICAR) 5 MG tablet Take 1 tablet by mouth daily Take 1/2 if under 779 systolic 30 tablet 5     No current facility-administered medications for this visit. No Known Allergies    Health Maintenance   Topic Date Due    Diabetic foot exam  Never done    DTaP/Tdap/Td vaccine (1 - Tdap) Never done    DEXA (modify frequency per FRAX score)  Never done    Colorectal Cancer Screen  Never done    COVID-19 Vaccine (5 - Booster for Pfizer series) 10/04/2022    Diabetic retinal exam  04/13/2023    Annual Wellness Visit (AWV)  07/06/2023    A1C test (Diabetic or Prediabetic)  10/06/2023    Lipids  10/06/2023    Depression Screen  11/07/2023    Breast cancer screen  04/29/2024    Flu vaccine  Completed    Shingles vaccine  Completed    Pneumococcal 65+ years Vaccine  Completed    Hepatitis C screen  Completed    Hepatitis A vaccine  Aged Out    Hib vaccine  Aged Out    Meningococcal (ACWY) vaccine  Aged Out       Subjective:      Review of Systems   Constitutional:  Negative for chills and fever. Respiratory:  Negative for shortness of breath. Gastrointestinal:  Negative for vomiting. Psychiatric/Behavioral:  Negative for sleep disturbance. Objective:     Physical Exam  Constitutional:       Appearance: She is well-developed. HENT:      Head: Normocephalic and atraumatic.       Right Ear: External ear normal.      Left Ear: External ear normal.   Eyes:      Conjunctiva/sclera: Conjunctivae normal.      Pupils: Pupils are equal, round, and reactive to light. Cardiovascular:      Rate and Rhythm: Normal rate and regular rhythm. Heart sounds: Normal heart sounds. Pulmonary:      Effort: Pulmonary effort is normal.      Breath sounds: Normal breath sounds. Musculoskeletal:      Cervical back: Neck supple. Skin:     General: Skin is warm and dry. Neurological:      Mental Status: She is alert and oriented to person, place, and time. Psychiatric:         Mood and Affect: Mood normal.         Behavior: Behavior normal.         Thought Content: Thought content normal.     /72   Pulse 78   Wt 133 lb (60.3 kg)   SpO2 96%   BMI 25.55 kg/m²     Assessment:      1. Type 2 diabetes mellitus without complication, without long-term current use of insulin (HCC)  - A1c 6.7, recommend continue healthy diet    2. Elevated liver enzymes  - will recheck labs. - Hepatic Function Panel; Future    3. Need for pneumococcal vaccine  - Pneumococcal, PCV20, PREVNAR 20, (age 25 yrs+), IM, PF    4. Elevated cholesterol  - continue statin. 5. Insomnia, unspecified type  - recommend trial of melatonin can try 3 mg, as pt notes was on melatonin in past and did not work but unsure which dose she was on . Plan:      Return in about 3 months (around 2/7/2023) for diabetes follow up. Orders Placed This Encounter   Procedures    Pneumococcal, PCV20, PREVNAR 21, (age 25 yrs+), IM, PF    Hepatic Function Panel     Standing Status:   Future     Number of Occurrences:   1     Standing Expiration Date:   11/7/2023     No orders of the defined types were placed in this encounter. Patient given educational materials - see patient instructions. Discussed use, benefit, and side effects of prescribed medications. All patient questions answered. Pt voiced understanding. Reviewed healthmaintenance.   Instructed to continue current medications, diet and exercise. Patient agreed with treatment plan. Follow up as directed.      Electronically signed by Fany Alvarenga DO on 11/7/2022 at 9:37 PM

## 2022-11-08 LAB
ALBUMIN SERPL-MCNC: 4.1 G/DL (ref 3.5–5.2)
ALBUMIN/GLOBULIN RATIO: 1.1 (ref 1–2.5)
ALP BLD-CCNC: 92 U/L (ref 35–104)
ALT SERPL-CCNC: 36 U/L (ref 5–33)
AST SERPL-CCNC: 57 U/L
BILIRUB SERPL-MCNC: 0.6 MG/DL (ref 0.3–1.2)
BILIRUBIN DIRECT: <0.1 MG/DL
BILIRUBIN, INDIRECT: ABNORMAL MG/DL (ref 0–1)
TOTAL PROTEIN: 7.7 G/DL (ref 6.4–8.3)

## 2022-12-19 RX ORDER — OLMESARTAN MEDOXOMIL 5 MG/1
5 TABLET ORAL DAILY
Qty: 30 TABLET | Refills: 5 | Status: SHIPPED | OUTPATIENT
Start: 2022-12-19

## 2023-01-04 ENCOUNTER — HOSPITAL ENCOUNTER (OUTPATIENT)
Age: 73
Setting detail: SPECIMEN
Discharge: HOME | End: 2023-01-04

## 2023-01-04 LAB
ABSOLUTE EOS #: 0.17 K/UL (ref 0–0.44)
ABSOLUTE IMMATURE GRANULOCYTE: <0.03 K/UL (ref 0–0.3)
ABSOLUTE LYMPH #: 2.34 K/UL (ref 1.1–3.7)
ABSOLUTE MONO #: 0.65 K/UL (ref 0.1–1.2)
ALBUMIN SERPL-MCNC: 4 G/DL (ref 3.5–5.2)
ANION GAP SERPL CALCULATED.3IONS-SCNC: 11 MMOL/L (ref 9–17)
BASOPHILS # BLD: 1 % (ref 0–2)
BASOPHILS ABSOLUTE: 0.05 K/UL (ref 0–0.2)
BILIRUBIN URINE: NEGATIVE
BUN BLDV-MCNC: 20 MG/DL (ref 8–23)
CALCIUM IONIZED: 1.31 MMOL/L (ref 1.13–1.33)
CALCIUM SERPL-MCNC: 9.6 MG/DL (ref 8.6–10.4)
CASTS UA: NORMAL /LPF (ref 0–8)
CHLORIDE BLD-SCNC: 105 MMOL/L (ref 98–107)
CO2: 24 MMOL/L (ref 20–31)
COLOR: YELLOW
CREAT SERPL-MCNC: 0.97 MG/DL (ref 0.5–0.9)
CREATININE URINE: 129.9 MG/DL (ref 28–217)
EOSINOPHILS RELATIVE PERCENT: 3 % (ref 1–4)
EPITHELIAL CELLS UA: NORMAL /HPF (ref 0–5)
GFR SERPL CREATININE-BSD FRML MDRD: >60 ML/MIN/1.73M2
GLUCOSE BLD-MCNC: 103 MG/DL (ref 70–99)
GLUCOSE URINE: NEGATIVE
HCT VFR BLD CALC: 45.4 % (ref 36.3–47.1)
HEMOGLOBIN: 14.2 G/DL (ref 11.9–15.1)
IMMATURE GRANULOCYTES: 0 %
KETONES, URINE: NEGATIVE
LEUKOCYTE ESTERASE, URINE: NEGATIVE
LYMPHOCYTES # BLD: 36 % (ref 24–43)
MAGNESIUM: 2.4 MG/DL (ref 1.6–2.6)
MCH RBC QN AUTO: 29 PG (ref 25.2–33.5)
MCHC RBC AUTO-ENTMCNC: 31.3 G/DL (ref 28.4–34.8)
MCV RBC AUTO: 92.7 FL (ref 82.6–102.9)
MICROALBUMIN/CREAT 24H UR: 92 MG/L
MICROALBUMIN/CREAT UR-RTO: 71 MCG/MG CREAT
MONOCYTES # BLD: 10 % (ref 3–12)
NITRITE, URINE: NEGATIVE
NRBC AUTOMATED: 0 PER 100 WBC
PDW BLD-RTO: 13.3 % (ref 11.8–14.4)
PH UA: 5.5 (ref 5–8)
PHOSPHORUS: 4.1 MG/DL (ref 2.6–4.5)
PLATELET # BLD: 432 K/UL (ref 138–453)
PMV BLD AUTO: 10.6 FL (ref 8.1–13.5)
POTASSIUM SERPL-SCNC: 4.5 MMOL/L (ref 3.7–5.3)
PROTEIN UA: ABNORMAL
PTH INTACT: 49.8 PG/ML (ref 14–72)
RBC # BLD: 4.9 M/UL (ref 3.95–5.11)
RBC UA: NORMAL /HPF (ref 0–4)
SEG NEUTROPHILS: 50 % (ref 36–65)
SEGMENTED NEUTROPHILS ABSOLUTE COUNT: 3.32 K/UL (ref 1.5–8.1)
SODIUM BLD-SCNC: 140 MMOL/L (ref 135–144)
SPECIFIC GRAVITY UA: 1.02 (ref 1–1.03)
TURBIDITY: CLEAR
URINE HGB: ABNORMAL
UROBILINOGEN, URINE: NORMAL
VITAMIN D 25-HYDROXY: 33.2 NG/ML
WBC # BLD: 6.6 K/UL (ref 3.5–11.3)
WBC UA: NORMAL /HPF (ref 0–5)

## 2023-01-06 RX ORDER — ROSUVASTATIN CALCIUM 40 MG/1
TABLET, COATED ORAL
Qty: 90 TABLET | Refills: 1 | Status: SHIPPED | OUTPATIENT
Start: 2023-01-06

## 2023-04-05 RX ORDER — OLMESARTAN MEDOXOMIL 5 MG/1
5 TABLET ORAL DAILY
Qty: 90 TABLET | Refills: 2 | Status: SHIPPED | OUTPATIENT
Start: 2023-04-05

## 2023-05-05 RX ORDER — OLMESARTAN MEDOXOMIL 5 MG/1
5 TABLET ORAL DAILY
Qty: 30 TABLET | Refills: 5 | Status: SHIPPED | OUTPATIENT
Start: 2023-05-05

## 2023-06-23 ENCOUNTER — OFFICE VISIT (OUTPATIENT)
Dept: FAMILY MEDICINE CLINIC | Age: 73
End: 2023-06-23

## 2023-06-23 VITALS
HEART RATE: 64 BPM | SYSTOLIC BLOOD PRESSURE: 136 MMHG | BODY MASS INDEX: 25.55 KG/M2 | RESPIRATION RATE: 16 BRPM | OXYGEN SATURATION: 95 % | WEIGHT: 133 LBS | DIASTOLIC BLOOD PRESSURE: 74 MMHG

## 2023-06-23 DIAGNOSIS — J02.9 SORE THROAT: Primary | ICD-10-CM

## 2023-06-23 DIAGNOSIS — R09.81 HEAD CONGESTION: ICD-10-CM

## 2023-06-23 DIAGNOSIS — R09.82 POST-NASAL DRIP: ICD-10-CM

## 2023-06-23 LAB — S PYO AG THROAT QL: NORMAL

## 2023-06-23 SDOH — ECONOMIC STABILITY: HOUSING INSECURITY
IN THE LAST 12 MONTHS, WAS THERE A TIME WHEN YOU DID NOT HAVE A STEADY PLACE TO SLEEP OR SLEPT IN A SHELTER (INCLUDING NOW)?: NO

## 2023-06-23 SDOH — ECONOMIC STABILITY: FOOD INSECURITY: WITHIN THE PAST 12 MONTHS, THE FOOD YOU BOUGHT JUST DIDN'T LAST AND YOU DIDN'T HAVE MONEY TO GET MORE.: NEVER TRUE

## 2023-06-23 SDOH — ECONOMIC STABILITY: FOOD INSECURITY: WITHIN THE PAST 12 MONTHS, YOU WORRIED THAT YOUR FOOD WOULD RUN OUT BEFORE YOU GOT MONEY TO BUY MORE.: NEVER TRUE

## 2023-06-23 SDOH — ECONOMIC STABILITY: INCOME INSECURITY: HOW HARD IS IT FOR YOU TO PAY FOR THE VERY BASICS LIKE FOOD, HOUSING, MEDICAL CARE, AND HEATING?: NOT HARD AT ALL

## 2023-06-23 ASSESSMENT — ENCOUNTER SYMPTOMS
SHORTNESS OF BREATH: 0
VOICE CHANGE: 0
SINUS PRESSURE: 0
SINUS PAIN: 0
COUGH: 0
SINUS COMPLAINT: 1
GASTROINTESTINAL NEGATIVE: 1
SORE THROAT: 1
TROUBLE SWALLOWING: 0

## 2023-06-23 ASSESSMENT — PATIENT HEALTH QUESTIONNAIRE - PHQ9
SUM OF ALL RESPONSES TO PHQ QUESTIONS 1-9: 0
1. LITTLE INTEREST OR PLEASURE IN DOING THINGS: 0
SUM OF ALL RESPONSES TO PHQ QUESTIONS 1-9: 0
SUM OF ALL RESPONSES TO PHQ9 QUESTIONS 1 & 2: 0
2. FEELING DOWN, DEPRESSED OR HOPELESS: 0

## 2023-06-23 NOTE — PROGRESS NOTES
Intact with Ionized Calcium   Result Value Ref Range    Pth Intact 49.8 14.0 - 72.0 pg/mL    Calcium, Ionized 1.31 1.13 - 1.33 mmol/L   Magnesium   Result Value Ref Range    Magnesium 2.4 1.6 - 2.6 mg/dL   Phosphorus   Result Value Ref Range    Phosphorus 4.1 2.6 - 4.5 mg/dL   Urinalysis   Result Value Ref Range    Color, UA Yellow Yellow    Turbidity UA Clear Clear    Glucose, Ur NEGATIVE NEGATIVE    Bilirubin Urine NEGATIVE NEGATIVE    Ketones, Urine NEGATIVE NEGATIVE    Specific Gravity, UA 1.020 1.005 - 1.030    Urine Hgb TRACE (A) NEGATIVE    pH, UA 5.5 5.0 - 8.0    Protein, UA 2+ (A) NEGATIVE    Urobilinogen, Urine Normal Normal    Nitrite, Urine NEGATIVE NEGATIVE    Leukocyte Esterase, Urine NEGATIVE NEGATIVE   Microalbumin, Ur   Result Value Ref Range    Microalb, Ur 92 (H) <21 mg/L    Creatinine, Ur 129.9 28.0 - 217.0 mg/dL    Microalb/Crt. Ratio 71 (H) <25 mcg/mg creat   Vitamin D 25 Hydroxy   Result Value Ref Range    Vit D, 25-Hydroxy 33.2 >29.9 ng/mL   Microscopic Urinalysis   Result Value Ref Range    WBC, UA None 0 - 5 /HPF    RBC, UA 2 TO 5 0 - 4 /HPF    Casts UA  0 - 8 /LPF     5 TO 10 HYALINE Reference range defined for non-centrifuged specimen. Epithelial Cells UA 0 TO 2 0 - 5 /HPF       Patient counseled:     Patient given educational materials - see patientinstructions. Discussed use, benefit, and side effects of prescribed medications. All patient questions answered. Pt verbalized understanding. Instructed to continue current medications, diet and exercise. Patient agreed with treatment plan. Follow up as directed.      Electronically signed by VINCENZO Campuzano CNP on 6/23/2023 at 10:20 AM

## 2023-06-26 ENCOUNTER — OFFICE VISIT (OUTPATIENT)
Dept: PRIMARY CARE CLINIC | Age: 73
End: 2023-06-26
Payer: MEDICARE

## 2023-06-26 VITALS
HEART RATE: 80 BPM | BODY MASS INDEX: 26.47 KG/M2 | WEIGHT: 140.2 LBS | OXYGEN SATURATION: 96 % | SYSTOLIC BLOOD PRESSURE: 116 MMHG | HEIGHT: 61 IN | DIASTOLIC BLOOD PRESSURE: 74 MMHG | TEMPERATURE: 97.3 F

## 2023-06-26 DIAGNOSIS — R05.1 ACUTE COUGH: ICD-10-CM

## 2023-06-26 DIAGNOSIS — J01.00 ACUTE NON-RECURRENT MAXILLARY SINUSITIS: Primary | ICD-10-CM

## 2023-06-26 PROCEDURE — 1123F ACP DISCUSS/DSCN MKR DOCD: CPT

## 2023-06-26 PROCEDURE — 99213 OFFICE O/P EST LOW 20 MIN: CPT

## 2023-06-26 RX ORDER — AMOXICILLIN AND CLAVULANATE POTASSIUM 875; 125 MG/1; MG/1
1 TABLET, FILM COATED ORAL 2 TIMES DAILY
Qty: 20 TABLET | Refills: 0 | Status: SHIPPED | OUTPATIENT
Start: 2023-06-26 | End: 2023-07-06

## 2023-06-26 RX ORDER — ASPIRIN 81 MG/1
TABLET ORAL
COMMUNITY

## 2023-06-26 RX ORDER — DIAPER,BRIEF,ADULT, DISPOSABLE
EACH MISCELLANEOUS
COMMUNITY

## 2023-06-26 ASSESSMENT — ENCOUNTER SYMPTOMS
EYE DISCHARGE: 0
SINUS PRESSURE: 1
COUGH: 1
WHEEZING: 0
SHORTNESS OF BREATH: 0
SORE THROAT: 1

## 2023-06-27 RX ORDER — ROSUVASTATIN CALCIUM 40 MG/1
TABLET, COATED ORAL
Qty: 90 TABLET | Refills: 1 | Status: SHIPPED | OUTPATIENT
Start: 2023-06-27

## 2023-10-24 ENCOUNTER — OFFICE VISIT (OUTPATIENT)
Dept: PRIMARY CARE CLINIC | Age: 73
End: 2023-10-24
Payer: MEDICARE

## 2023-10-24 VITALS
WEIGHT: 139 LBS | BODY MASS INDEX: 26.24 KG/M2 | SYSTOLIC BLOOD PRESSURE: 130 MMHG | RESPIRATION RATE: 14 BRPM | DIASTOLIC BLOOD PRESSURE: 72 MMHG | OXYGEN SATURATION: 97 % | HEART RATE: 58 BPM | HEIGHT: 61 IN

## 2023-10-24 DIAGNOSIS — N18.30 STAGE 3 CHRONIC KIDNEY DISEASE, UNSPECIFIED WHETHER STAGE 3A OR 3B CKD (HCC): ICD-10-CM

## 2023-10-24 DIAGNOSIS — G89.29 CHRONIC PAIN OF RIGHT KNEE: ICD-10-CM

## 2023-10-24 DIAGNOSIS — I10 PRIMARY HYPERTENSION: ICD-10-CM

## 2023-10-24 DIAGNOSIS — M25.561 CHRONIC PAIN OF RIGHT KNEE: ICD-10-CM

## 2023-10-24 DIAGNOSIS — E11.9 TYPE 2 DIABETES MELLITUS WITHOUT COMPLICATION, WITHOUT LONG-TERM CURRENT USE OF INSULIN (HCC): Primary | ICD-10-CM

## 2023-10-24 LAB — HBA1C MFR BLD: 6.4 %

## 2023-10-24 PROCEDURE — 3075F SYST BP GE 130 - 139MM HG: CPT | Performed by: FAMILY MEDICINE

## 2023-10-24 PROCEDURE — 99214 OFFICE O/P EST MOD 30 MIN: CPT | Performed by: FAMILY MEDICINE

## 2023-10-24 PROCEDURE — 3044F HG A1C LEVEL LT 7.0%: CPT | Performed by: FAMILY MEDICINE

## 2023-10-24 PROCEDURE — 83036 HEMOGLOBIN GLYCOSYLATED A1C: CPT | Performed by: FAMILY MEDICINE

## 2023-10-24 PROCEDURE — 1123F ACP DISCUSS/DSCN MKR DOCD: CPT | Performed by: FAMILY MEDICINE

## 2023-10-24 PROCEDURE — 3078F DIAST BP <80 MM HG: CPT | Performed by: FAMILY MEDICINE

## 2023-10-24 ASSESSMENT — PATIENT HEALTH QUESTIONNAIRE - PHQ9
SUM OF ALL RESPONSES TO PHQ QUESTIONS 1-9: 0
SUM OF ALL RESPONSES TO PHQ9 QUESTIONS 1 & 2: 0
SUM OF ALL RESPONSES TO PHQ QUESTIONS 1-9: 0
2. FEELING DOWN, DEPRESSED OR HOPELESS: 0
1. LITTLE INTEREST OR PLEASURE IN DOING THINGS: 0

## 2023-10-24 ASSESSMENT — ENCOUNTER SYMPTOMS
VOMITING: 0
SHORTNESS OF BREATH: 0

## 2023-10-24 NOTE — PROGRESS NOTES
ophthalmic solution 1 drop nightly      triamcinolone (NASACORT) 55 MCG/ACT nasal inhaler 2 sprays by Each Nostril route daily      Cholecalciferol (VITAMIN D) 10 MCG (400 UNIT) CAPS Capsule  (Patient not taking: Reported on 6/26/2023)      aspirin 81 MG EC tablet  (Patient not taking: Reported on 10/24/2023)      tretinoin (RETIN-A) 0.05 % cream Apply topically nightly Apply topically nightly. (Patient not taking: Reported on 10/24/2023)       No current facility-administered medications for this visit. Allergies   Allergen Reactions    Doxycycline Hyclate        Health Maintenance   Topic Date Due    Diabetic foot exam  Never done    Diabetic retinal exam  Never done    DTaP/Tdap/Td vaccine (1 - Tdap) Never done    DEXA (modify frequency per FRAX score)  Never done    Hepatitis B vaccine (1 of 3 - Risk 3-dose series) Never done    COVID-19 Vaccine (7 - Pfizer series) 04/14/2023    Annual Wellness Visit (AWV)  07/06/2023    Lipids  10/06/2023    Diabetic Alb to Cr ratio (uACR) test  01/04/2024    GFR test (Diabetes, CKD 3-4, OR last GFR 15-59)  01/04/2024    Breast cancer screen  04/29/2024    Depression Screen  06/23/2024    Colorectal Cancer Screen  08/06/2024    A1C test (Diabetic or Prediabetic)  10/24/2024    Flu vaccine  Completed    Shingles vaccine  Completed    Pneumococcal 65+ years Vaccine  Completed    Hepatitis C screen  Completed    Hepatitis A vaccine  Aged Out    Hib vaccine  Aged Out    Meningococcal (ACWY) vaccine  Aged Out    Diabetes screen  Discontinued       Subjective:      Review of Systems   Constitutional:  Negative for chills and fever. Respiratory:  Negative for shortness of breath. Gastrointestinal:  Negative for vomiting. Musculoskeletal:  Positive for arthralgias (knee pain). Objective:     Physical Exam  Constitutional:       Appearance: She is well-developed. HENT:      Head: Normocephalic and atraumatic.       Right Ear: External ear normal.      Left Ear:

## 2023-10-30 ENCOUNTER — HOSPITAL ENCOUNTER (OUTPATIENT)
Age: 73
Setting detail: SPECIMEN
Discharge: HOME | End: 2023-10-30

## 2023-10-30 ENCOUNTER — HOSPITAL ENCOUNTER (OUTPATIENT)
Dept: GENERAL RADIOLOGY | Age: 73
Discharge: HOME | End: 2023-11-01
Payer: MEDICARE

## 2023-10-30 ENCOUNTER — HOSPITAL ENCOUNTER (OUTPATIENT)
Age: 73
Discharge: HOME | End: 2023-11-01
Payer: MEDICARE

## 2023-10-30 DIAGNOSIS — N18.30 STAGE 3 CHRONIC KIDNEY DISEASE, UNSPECIFIED WHETHER STAGE 3A OR 3B CKD (HCC): ICD-10-CM

## 2023-10-30 DIAGNOSIS — M25.561 CHRONIC PAIN OF RIGHT KNEE: ICD-10-CM

## 2023-10-30 DIAGNOSIS — G89.29 CHRONIC PAIN OF RIGHT KNEE: ICD-10-CM

## 2023-10-30 DIAGNOSIS — E11.9 TYPE 2 DIABETES MELLITUS WITHOUT COMPLICATION, WITHOUT LONG-TERM CURRENT USE OF INSULIN (HCC): ICD-10-CM

## 2023-10-30 LAB
ALBUMIN SERPL-MCNC: 4.5 G/DL (ref 3.5–5.2)
ALBUMIN/GLOB SERPL: 1.5 {RATIO} (ref 1–2.5)
ALP SERPL-CCNC: 73 U/L (ref 35–104)
ALT SERPL-CCNC: 27 U/L (ref 5–33)
ANION GAP SERPL CALCULATED.3IONS-SCNC: 10 MMOL/L (ref 9–17)
AST SERPL-CCNC: 29 U/L
BASOPHILS # BLD: 0.06 K/UL (ref 0–0.2)
BASOPHILS NFR BLD: 1 % (ref 0–2)
BILIRUB SERPL-MCNC: 0.9 MG/DL (ref 0.3–1.2)
BUN SERPL-MCNC: 25 MG/DL (ref 8–23)
CALCIUM SERPL-MCNC: 10.1 MG/DL (ref 8.6–10.4)
CHLORIDE SERPL-SCNC: 104 MMOL/L (ref 98–107)
CHOLEST SERPL-MCNC: 148 MG/DL
CHOLESTEROL/HDL RATIO: 2.7
CO2 SERPL-SCNC: 25 MMOL/L (ref 20–31)
CREAT SERPL-MCNC: 0.8 MG/DL (ref 0.5–0.9)
CREAT UR-MCNC: 169.5 MG/DL (ref 28–217)
EOSINOPHIL # BLD: 0.15 K/UL (ref 0–0.44)
EOSINOPHILS RELATIVE PERCENT: 2 % (ref 1–4)
ERYTHROCYTE [DISTWIDTH] IN BLOOD BY AUTOMATED COUNT: 13.6 % (ref 11.8–14.4)
GFR SERPL CREATININE-BSD FRML MDRD: >60 ML/MIN/1.73M2
GLUCOSE SERPL-MCNC: 117 MG/DL (ref 70–99)
HCT VFR BLD AUTO: 48.4 % (ref 36.3–47.1)
HDLC SERPL-MCNC: 55 MG/DL
HGB BLD-MCNC: 15.3 G/DL (ref 11.9–15.1)
IMM GRANULOCYTES # BLD AUTO: 0.03 K/UL (ref 0–0.3)
IMM GRANULOCYTES NFR BLD: 0 %
LDLC SERPL CALC-MCNC: 57 MG/DL (ref 0–130)
LYMPHOCYTES NFR BLD: 2.66 K/UL (ref 1.1–3.7)
LYMPHOCYTES RELATIVE PERCENT: 35 % (ref 24–43)
MCH RBC QN AUTO: 29.9 PG (ref 25.2–33.5)
MCHC RBC AUTO-ENTMCNC: 31.6 G/DL (ref 28.4–34.8)
MCV RBC AUTO: 94.7 FL (ref 82.6–102.9)
MICROALBUMIN UR-MCNC: 17 MG/L
MICROALBUMIN/CREAT UR-RTO: 10 MCG/MG CREAT
MONOCYTES NFR BLD: 0.84 K/UL (ref 0.1–1.2)
MONOCYTES NFR BLD: 11 % (ref 3–12)
NEUTROPHILS NFR BLD: 51 % (ref 36–65)
NEUTS SEG NFR BLD: 3.95 K/UL (ref 1.5–8.1)
NRBC BLD-RTO: 0 PER 100 WBC
PLATELET # BLD AUTO: 427 K/UL (ref 138–453)
PMV BLD AUTO: 10.6 FL (ref 8.1–13.5)
POTASSIUM SERPL-SCNC: 4.8 MMOL/L (ref 3.7–5.3)
PROT SERPL-MCNC: 7.5 G/DL (ref 6.4–8.3)
RBC # BLD AUTO: 5.11 M/UL (ref 3.95–5.11)
SODIUM SERPL-SCNC: 139 MMOL/L (ref 135–144)
TRIGL SERPL-MCNC: 181 MG/DL
WBC OTHER # BLD: 7.7 K/UL (ref 3.5–11.3)

## 2023-10-30 PROCEDURE — 73562 X-RAY EXAM OF KNEE 3: CPT

## 2023-11-07 DIAGNOSIS — M11.20 PSEUDOGOUT: ICD-10-CM

## 2023-11-07 DIAGNOSIS — G89.29 CHRONIC PAIN OF RIGHT KNEE: Primary | ICD-10-CM

## 2023-11-07 DIAGNOSIS — M25.561 CHRONIC PAIN OF RIGHT KNEE: Primary | ICD-10-CM

## 2024-01-03 RX ORDER — ROSUVASTATIN CALCIUM 40 MG/1
40 TABLET, COATED ORAL NIGHTLY
Qty: 90 TABLET | Refills: 1 | Status: SHIPPED | OUTPATIENT
Start: 2024-01-03

## 2024-02-02 ENCOUNTER — HOSPITAL ENCOUNTER (OUTPATIENT)
Age: 74
Setting detail: SPECIMEN
Discharge: HOME | End: 2024-02-02

## 2024-02-02 ENCOUNTER — OFFICE VISIT (OUTPATIENT)
Dept: PRIMARY CARE CLINIC | Age: 74
End: 2024-02-02

## 2024-02-02 VITALS
HEIGHT: 61 IN | WEIGHT: 139 LBS | OXYGEN SATURATION: 98 % | BODY MASS INDEX: 26.24 KG/M2 | DIASTOLIC BLOOD PRESSURE: 88 MMHG | SYSTOLIC BLOOD PRESSURE: 138 MMHG | HEART RATE: 61 BPM

## 2024-02-02 DIAGNOSIS — R39.9 UTI SYMPTOMS: Primary | ICD-10-CM

## 2024-02-02 LAB
BILIRUBIN, POC: NEGATIVE
BLOOD URINE, POC: ABNORMAL
CLARITY, POC: CLEAR
COLOR, POC: YELLOW
GLUCOSE URINE, POC: NEGATIVE
KETONES, POC: NEGATIVE
LEUKOCYTE EST, POC: NEGATIVE
NITRITE, POC: NEGATIVE
PH, POC: 6
PROTEIN, POC: ABNORMAL
SPECIFIC GRAVITY, POC: 1.03
UROBILINOGEN, POC: NEGATIVE

## 2024-02-02 RX ORDER — CEPHALEXIN 500 MG/1
500 CAPSULE ORAL 2 TIMES DAILY
Qty: 10 CAPSULE | Refills: 0 | Status: SHIPPED | OUTPATIENT
Start: 2024-02-02 | End: 2024-02-07

## 2024-02-02 ASSESSMENT — PATIENT HEALTH QUESTIONNAIRE - PHQ9
SUM OF ALL RESPONSES TO PHQ QUESTIONS 1-9: 0
SUM OF ALL RESPONSES TO PHQ9 QUESTIONS 1 & 2: 0
1. LITTLE INTEREST OR PLEASURE IN DOING THINGS: 0
SUM OF ALL RESPONSES TO PHQ QUESTIONS 1-9: 0
2. FEELING DOWN, DEPRESSED OR HOPELESS: 0

## 2024-02-02 ASSESSMENT — ENCOUNTER SYMPTOMS
VOMITING: 0
NAUSEA: 0
BACK PAIN: 0
ABDOMINAL PAIN: 0

## 2024-02-02 NOTE — PROGRESS NOTES
[facility]  [facility] PRIMARY CARE  [address]  [place] [address]    [facility] WALK-IN  [address],  [address]  [facility] [address]  Dept: [phone]    Mei Vizcarra is a 73 y.o. female Established patient, who presents to the walk-in clinic today with conditions/complaints as noted below:    Chief Complaint   Patient presents with    Urinary Tract Infection         HPI:     Patient is a 73-year-old female that presents today with concerns for a UTI. Pertinent PMH includes T2DM and CKD. Her symptoms started 3-4 days ago. Reports burning with urination and discomfort in the vaginal region; denies vaginal discharge. Notes that she always has urinary frequency and urgency; no acute changes or hematuria. No fevers or chills. Denies abdominal pain, nausea, vomiting, or flank pain. No history of pyelonephritis or frequent UTIs. She hasn't tried anything for her symptoms.        History reviewed. No pertinent past medical history.    Current Outpatient Medications   Medication Sig Dispense Refill    cephALEXin (KEFLEX) 500 MG capsule Take 1 capsule by mouth 2 times daily for 5 days 10 capsule 0    rosuvastatin (CRESTOR) 40 MG tablet Take 1 tablet by mouth nightly 90 tablet 1    coenzyme Q10 ([employer] COQ-10) 200 MG CAPS capsule       olmesartan (BENICAR) 5 MG tablet Take 1 tablet by mouth daily Take 1/2 if under 140 systolic 90 tablet 2    timolol (TIMOPTIC) 0.5 % ophthalmic solution 1 drop 2 times daily      latanoprost (XALATAN) 0.005 % ophthalmic solution 1 drop nightly      triamcinolone (NASACORT) 55 MCG/ACT nasal inhaler 2 sprays by Each Nostril route daily      Cholecalciferol (VITAMIN D) 10 MCG (400 UNIT) CAPS Capsule  (Patient not taking: Reported on 6/26/2023)      aspirin 81 MG EC tablet  (Patient not taking: Reported on 10/24/2023)      tretinoin (RETIN-A) 0.05 % cream Apply topically nightly Apply topically

## 2024-02-02 NOTE — PATIENT INSTRUCTIONS
Will call with results.  Finish the entire course of antibiotic treatment.  Push hydration and avoid bladder irritants.  Follow-up as needed.

## 2024-02-03 DIAGNOSIS — R39.9 UTI SYMPTOMS: ICD-10-CM

## 2024-02-04 LAB
MICROORGANISM SPEC CULT: NORMAL
SPECIMEN DESCRIPTION: NORMAL

## 2024-02-08 ENCOUNTER — APPOINTMENT (OUTPATIENT)
Dept: CT IMAGING | Age: 74
End: 2024-02-08
Payer: MEDICARE

## 2024-02-08 ENCOUNTER — HOSPITAL ENCOUNTER (EMERGENCY)
Age: 74
Discharge: HOME | End: 2024-02-08
Attending: EMERGENCY MEDICINE
Payer: MEDICARE

## 2024-02-08 VITALS
WEIGHT: 138 LBS | HEIGHT: 61 IN | DIASTOLIC BLOOD PRESSURE: 73 MMHG | HEART RATE: 60 BPM | BODY MASS INDEX: 26.06 KG/M2 | RESPIRATION RATE: 18 BRPM | SYSTOLIC BLOOD PRESSURE: 179 MMHG | OXYGEN SATURATION: 98 % | TEMPERATURE: 97.9 F

## 2024-02-08 DIAGNOSIS — N20.1 URETEROLITHIASIS: Primary | ICD-10-CM

## 2024-02-08 LAB
ALBUMIN SERPL-MCNC: 4.4 G/DL (ref 3.5–5.2)
ALBUMIN/GLOB SERPL: 1.4 {RATIO} (ref 1–2.5)
ALP SERPL-CCNC: 78 U/L (ref 35–104)
ALT SERPL-CCNC: 34 U/L (ref 5–33)
ANION GAP SERPL CALCULATED.3IONS-SCNC: 12 MMOL/L (ref 9–17)
AST SERPL-CCNC: 27 U/L
BACTERIA URNS QL MICRO: ABNORMAL
BASOPHILS # BLD: 0.1 K/UL (ref 0–0.2)
BASOPHILS NFR BLD: 1 % (ref 0–2)
BILIRUB SERPL-MCNC: 0.6 MG/DL (ref 0.3–1.2)
BILIRUB UR QL STRIP: NEGATIVE
BUN SERPL-MCNC: 26 MG/DL (ref 8–23)
CALCIUM SERPL-MCNC: 10.4 MG/DL (ref 8.6–10.4)
CHARACTER UR: ABNORMAL
CHLORIDE SERPL-SCNC: 106 MMOL/L (ref 98–107)
CLARITY UR: CLEAR
CO2 SERPL-SCNC: 23 MMOL/L (ref 20–31)
COLOR UR: YELLOW
CREAT SERPL-MCNC: 1.1 MG/DL (ref 0.5–0.9)
EOSINOPHIL # BLD: 0.2 K/UL (ref 0–0.4)
EOSINOPHILS RELATIVE PERCENT: 2 % (ref 1–4)
EPI CELLS #/AREA URNS HPF: ABNORMAL /HPF (ref 0–5)
ERYTHROCYTE [DISTWIDTH] IN BLOOD BY AUTOMATED COUNT: 14.1 % (ref 12.5–15.4)
GFR SERPL CREATININE-BSD FRML MDRD: 53 ML/MIN/1.73M2
GLUCOSE SERPL-MCNC: 141 MG/DL (ref 70–99)
GLUCOSE UR STRIP-MCNC: NEGATIVE MG/DL
HCT VFR BLD AUTO: 42.4 % (ref 36–46)
HGB BLD-MCNC: 14.3 G/DL (ref 12–16)
HGB UR QL STRIP.AUTO: ABNORMAL
KETONES UR STRIP-MCNC: NEGATIVE MG/DL
LEUKOCYTE ESTERASE UR QL STRIP: ABNORMAL
LIPASE SERPL-CCNC: 55 U/L (ref 13–60)
LYMPHOCYTES NFR BLD: 2.4 K/UL (ref 1–4.8)
LYMPHOCYTES RELATIVE PERCENT: 30 % (ref 24–44)
MAGNESIUM SERPL-MCNC: 2.2 MG/DL (ref 1.6–2.6)
MCH RBC QN AUTO: 29.7 PG (ref 26–34)
MCHC RBC AUTO-ENTMCNC: 33.6 G/DL (ref 31–37)
MCV RBC AUTO: 88.3 FL (ref 80–100)
MONOCYTES NFR BLD: 0.8 K/UL (ref 0.1–1.2)
MONOCYTES NFR BLD: 10 % (ref 2–11)
NEUTROPHILS NFR BLD: 57 % (ref 36–66)
NEUTS SEG NFR BLD: 4.6 K/UL (ref 1.8–7.7)
NITRITE UR QL STRIP: NEGATIVE
PH UR STRIP: 6 [PH] (ref 5–8)
PLATELET # BLD AUTO: 434 K/UL (ref 140–450)
PMV BLD AUTO: 8.5 FL (ref 6–12)
POC TROPONIN I: 0 NG/ML (ref 0–0.1)
POC TROPONIN INTERP: NORMAL
POTASSIUM SERPL-SCNC: 4.3 MMOL/L (ref 3.7–5.3)
PROT SERPL-MCNC: 7.6 G/DL (ref 6.4–8.3)
PROT UR STRIP-MCNC: NEGATIVE MG/DL
RBC # BLD AUTO: 4.8 M/UL (ref 4–5.2)
RBC #/AREA URNS HPF: ABNORMAL /HPF (ref 0–2)
SODIUM SERPL-SCNC: 141 MMOL/L (ref 135–144)
SP GR UR STRIP: 1 (ref 1–1.03)
TROPONIN I SERPL HS-MCNC: 7 NG/L (ref 0–14)
UROBILINOGEN UR STRIP-ACNC: NORMAL EU/DL (ref 0–1)
WBC #/AREA URNS HPF: ABNORMAL /HPF (ref 0–5)
WBC OTHER # BLD: 8.1 K/UL (ref 3.5–11)

## 2024-02-08 PROCEDURE — 96374 THER/PROPH/DIAG INJ IV PUSH: CPT

## 2024-02-08 PROCEDURE — 96375 TX/PRO/DX INJ NEW DRUG ADDON: CPT

## 2024-02-08 PROCEDURE — 74176 CT ABD & PELVIS W/O CONTRAST: CPT

## 2024-02-08 PROCEDURE — 6370000000 HC RX 637 (ALT 250 FOR IP): Performed by: EMERGENCY MEDICINE

## 2024-02-08 PROCEDURE — 80053 COMPREHEN METABOLIC PANEL: CPT

## 2024-02-08 PROCEDURE — 99284 EMERGENCY DEPT VISIT MOD MDM: CPT

## 2024-02-08 PROCEDURE — 85025 COMPLETE CBC W/AUTO DIFF WBC: CPT

## 2024-02-08 PROCEDURE — 81001 URINALYSIS AUTO W/SCOPE: CPT

## 2024-02-08 PROCEDURE — 2580000003 HC RX 258: Performed by: EMERGENCY MEDICINE

## 2024-02-08 PROCEDURE — 84484 ASSAY OF TROPONIN QUANT: CPT

## 2024-02-08 PROCEDURE — 93005 ELECTROCARDIOGRAM TRACING: CPT | Performed by: EMERGENCY MEDICINE

## 2024-02-08 PROCEDURE — 36415 COLL VENOUS BLD VENIPUNCTURE: CPT

## 2024-02-08 PROCEDURE — 6360000002 HC RX W HCPCS: Performed by: EMERGENCY MEDICINE

## 2024-02-08 PROCEDURE — 83735 ASSAY OF MAGNESIUM: CPT

## 2024-02-08 PROCEDURE — 83690 ASSAY OF LIPASE: CPT

## 2024-02-08 RX ORDER — CEPHALEXIN 250 MG/1
500 CAPSULE ORAL ONCE
Status: COMPLETED | OUTPATIENT
Start: 2024-02-08 | End: 2024-02-08

## 2024-02-08 RX ORDER — 0.9 % SODIUM CHLORIDE 0.9 %
1000 INTRAVENOUS SOLUTION INTRAVENOUS ONCE
Status: COMPLETED | OUTPATIENT
Start: 2024-02-08 | End: 2024-02-08

## 2024-02-08 RX ORDER — CEPHALEXIN 500 MG/1
500 CAPSULE ORAL 3 TIMES DAILY
Qty: 21 CAPSULE | Refills: 0 | Status: SHIPPED | OUTPATIENT
Start: 2024-02-08 | End: 2024-02-15

## 2024-02-08 RX ORDER — TAMSULOSIN HYDROCHLORIDE 0.4 MG/1
0.4 CAPSULE ORAL DAILY
Qty: 5 CAPSULE | Refills: 0 | Status: SHIPPED | OUTPATIENT
Start: 2024-02-08 | End: 2024-02-13

## 2024-02-08 RX ORDER — TAMSULOSIN HYDROCHLORIDE 0.4 MG/1
0.4 CAPSULE ORAL ONCE
Status: COMPLETED | OUTPATIENT
Start: 2024-02-08 | End: 2024-02-08

## 2024-02-08 RX ORDER — ONDANSETRON 2 MG/ML
4 INJECTION INTRAMUSCULAR; INTRAVENOUS ONCE
Status: COMPLETED | OUTPATIENT
Start: 2024-02-08 | End: 2024-02-08

## 2024-02-08 RX ORDER — KETOROLAC TROMETHAMINE 15 MG/ML
15 INJECTION, SOLUTION INTRAMUSCULAR; INTRAVENOUS ONCE
Status: COMPLETED | OUTPATIENT
Start: 2024-02-08 | End: 2024-02-08

## 2024-02-08 RX ORDER — OXYCODONE HYDROCHLORIDE AND ACETAMINOPHEN 5; 325 MG/1; MG/1
1-2 TABLET ORAL EVERY 6 HOURS PRN
Qty: 14 TABLET | Refills: 0 | Status: SHIPPED | OUTPATIENT
Start: 2024-02-08 | End: 2024-02-13

## 2024-02-08 RX ORDER — IBUPROFEN 600 MG/1
600 TABLET ORAL EVERY 8 HOURS PRN
Qty: 30 TABLET | Refills: 0 | Status: SHIPPED | OUTPATIENT
Start: 2024-02-08

## 2024-02-08 RX ORDER — ONDANSETRON 4 MG/1
4 TABLET, ORALLY DISINTEGRATING ORAL 3 TIMES DAILY PRN
Qty: 14 TABLET | Refills: 0 | Status: SHIPPED | OUTPATIENT
Start: 2024-02-08

## 2024-02-08 RX ADMIN — ONDANSETRON 4 MG: 2 INJECTION INTRAMUSCULAR; INTRAVENOUS at 07:33

## 2024-02-08 RX ADMIN — CEPHALEXIN 500 MG: 250 CAPSULE ORAL at 10:01

## 2024-02-08 RX ADMIN — SODIUM CHLORIDE 1000 ML: 9 INJECTION, SOLUTION INTRAVENOUS at 07:32

## 2024-02-08 RX ADMIN — TAMSULOSIN HYDROCHLORIDE 0.4 MG: 0.4 CAPSULE ORAL at 10:01

## 2024-02-08 RX ADMIN — KETOROLAC TROMETHAMINE 15 MG: 15 INJECTION, SOLUTION INTRAMUSCULAR; INTRAVENOUS at 07:33

## 2024-02-08 ASSESSMENT — ENCOUNTER SYMPTOMS
ABDOMINAL PAIN: 0
RHINORRHEA: 0
NAUSEA: 0
COUGH: 0
SORE THROAT: 0
SHORTNESS OF BREATH: 0
BACK PAIN: 0
VOMITING: 0
DIARRHEA: 0
EYE PAIN: 0

## 2024-02-08 ASSESSMENT — PAIN - FUNCTIONAL ASSESSMENT: PAIN_FUNCTIONAL_ASSESSMENT: 0-10

## 2024-02-08 ASSESSMENT — PAIN SCALES - GENERAL: PAINLEVEL_OUTOF10: 8

## 2024-02-08 NOTE — DISCHARGE INSTRUCTIONS
PLEASE RETURN TO THE EMERGENCY DEPARTMENT IMMEDIATELY if your symptoms worsen in anyway or in 1-2 days if not improved for re-evaluation.  You should immediately return to the ER for symptoms such as abdominal or back pain, fever, a feeling of passing out, light headed, dizziness, chest pain, shortness of breath, persistent nausea and/or vomiting, numbness or weakness to the arms or legs, coolness or color change of the arms or legs.      Take your medication as indicated and prescribed.  If you are given an antibiotic then, make sure you get the prescription filled and take the antibiotics until finished.  You should encourage fluids.    Please understand that at this time there is no evidence for a more serious underlying process, but that early in the process of an illness or injury, an emergency department workup can be falsely reassuring.  You should contact your family doctor within the next 24 hours for a follow up appointment    THANK YOU!!!    From [facility] and [facility]    On behalf of the Emergency Department staff at [facility], I would like to thank you for giving us the opportunity to address your health care needs and concerns.    We hope that during your visit, our service was delivered in a professional and caring manner. Please keep [facility] in mind as we walk with you down the path to your own personal wellness.     Please expect an automated text message or email from us so we can ask a few questions about your health and progress. Based on your answers, a clinician may call you back to offer help and instructions.    Please understand that early in the process of an illness or injury, an emergency department workup can be falsely reassuring.  If you notice any worsening, changing or persistent symptoms please call your family doctor or return to the ER immediately.     Tell us how we did during your visit at [email or web address]   and let us know about

## 2024-02-08 NOTE — ED PROVIDER NOTES
[facility] Emergency Department  [address]  [place] [address]  Phone: [phone]  Fax: [phone]    EMERGENCY DEPARTMENT ENCOUNTER          Pt Name: Mei Vizcarra  MRN: [id number]  Birthdate 1950  Date of evaluation: 2/8/2024      CHIEF COMPLAINT       Chief Complaint   Patient presents with    Flank Pain     R sided        HISTORY OF PRESENT ILLNESS       Mei Vizcarra is a 73 y.o. female who presents with right flank pain that began around 330 this morning.  Has had ureteric stones in the past but has been over 20 years since.  So like she has had some mild hematuria as well.  Pain also radiates around to the right anterior abdomen region.  No fevers.  Some nausea without vomiting.  No dysuria.  No trauma.  No other symptoms or concerns    REVIEW OF SYSTEMS       Review of Systems   Constitutional:  Negative for chills, fatigue and fever.   HENT:  Negative for rhinorrhea and sore throat.    Eyes:  Negative for pain.   Respiratory:  Negative for cough and shortness of breath.    Cardiovascular:  Negative for chest pain.   Gastrointestinal:  Negative for abdominal pain, diarrhea, nausea and vomiting.   Genitourinary:  Positive for flank pain and hematuria. Negative for difficulty urinating and dysuria.   Musculoskeletal:  Negative for back pain and neck pain.   Skin:  Negative for rash.   Neurological:  Negative for weakness and headaches.        PAST MEDICAL HISTORY    has a past medical history of Hyperlipidemia and Hypertension.    SURGICAL HISTORY      has a past surgical history that includes Hysterectomy, vaginal (10/2022); Cholecystectomy; Appendectomy; and Tonsillectomy.    CURRENT MEDICATIONS       Previous Medications    COENZYME Q10 ([employer] COQ-10) 200 MG CAPS CAPSULE        LATANOPROST (XALATAN) 0.005 % OPHTHALMIC SOLUTION    1 drop nightly    OLMESARTAN (BENICAR) 5 MG TABLET    Take 1 tablet by mouth daily Take 1/2 if under 140 systolic    ROSUVASTATIN

## 2024-02-09 LAB
EKG ATRIAL RATE: 55 BPM
EKG P AXIS: -19 DEGREES
EKG P-R INTERVAL: 188 MS
EKG Q-T INTERVAL: 426 MS
EKG QRS DURATION: 86 MS
EKG QTC CALCULATION (BAZETT): 407 MS
EKG R AXIS: 16 DEGREES
EKG T AXIS: 21 DEGREES
EKG VENTRICULAR RATE: 55 BPM

## 2024-03-04 ENCOUNTER — TRANSCRIBE ORDERS (OUTPATIENT)
Dept: ADMINISTRATIVE | Age: 74
End: 2024-03-04

## 2024-03-04 DIAGNOSIS — N94.819 VULVODYNIA, UNSPECIFIED: Primary | ICD-10-CM

## 2024-03-08 ENCOUNTER — HOSPITAL ENCOUNTER (OUTPATIENT)
Dept: MRI IMAGING | Age: 74
End: 2024-03-08
Payer: MEDICARE

## 2024-03-08 DIAGNOSIS — N94.819 VULVODYNIA, UNSPECIFIED: ICD-10-CM

## 2024-03-08 LAB — CREAT BLD-MCNC: 1.4 MG/DL (ref 0.6–1.4)

## 2024-03-08 PROCEDURE — 2580000003 HC RX 258

## 2024-03-08 PROCEDURE — 6360000004 HC RX CONTRAST MEDICATION

## 2024-03-08 PROCEDURE — 82565 ASSAY OF CREATININE: CPT

## 2024-03-08 PROCEDURE — 72197 MRI PELVIS W/O & W/DYE: CPT

## 2024-03-08 PROCEDURE — A9579 GAD-BASE MR CONTRAST NOS,1ML: HCPCS

## 2024-03-08 RX ORDER — SODIUM CHLORIDE 0.9 % (FLUSH) 0.9 %
10 SYRINGE (ML) INJECTION PRN
Status: ACTIVE | OUTPATIENT
Start: 2024-03-08

## 2024-03-08 RX ORDER — 0.9 % SODIUM CHLORIDE 0.9 %
50 INTRAVENOUS SOLUTION INTRAVENOUS ONCE
Status: COMPLETED | OUTPATIENT
Start: 2024-03-08 | End: 2024-03-08

## 2024-03-08 RX ADMIN — SODIUM CHLORIDE, PRESERVATIVE FREE 10 ML: 5 INJECTION INTRAVENOUS at 15:34

## 2024-03-08 RX ADMIN — GADOTERIDOL 12 ML: 279.3 INJECTION, SOLUTION INTRAVENOUS at 15:34

## 2024-03-08 RX ADMIN — SODIUM CHLORIDE 50 ML: 9 INJECTION, SOLUTION INTRAVENOUS at 15:33

## 2024-03-15 ENCOUNTER — OFFICE VISIT (OUTPATIENT)
Dept: PRIMARY CARE CLINIC | Age: 74
End: 2024-03-15

## 2024-03-15 VITALS
TEMPERATURE: 97.2 F | OXYGEN SATURATION: 99 % | DIASTOLIC BLOOD PRESSURE: 70 MMHG | WEIGHT: 138.6 LBS | SYSTOLIC BLOOD PRESSURE: 122 MMHG | BODY MASS INDEX: 26.17 KG/M2 | HEART RATE: 61 BPM | HEIGHT: 61 IN

## 2024-03-15 DIAGNOSIS — H65.93 OME (OTITIS MEDIA WITH EFFUSION), BILATERAL: ICD-10-CM

## 2024-03-15 DIAGNOSIS — R09.89 SYMPTOMS OF UPPER RESPIRATORY INFECTION (URI): ICD-10-CM

## 2024-03-15 DIAGNOSIS — J01.40 ACUTE NON-RECURRENT PANSINUSITIS: Primary | ICD-10-CM

## 2024-03-15 LAB
INFLUENZA A ANTIGEN, POC: NEGATIVE
INFLUENZA B ANTIGEN, POC: NEGATIVE
Lab: NORMAL
QC PASS/FAIL: NORMAL
SARS-COV-2 RDRP RESP QL NAA+PROBE: NEGATIVE
VALID INTERNAL CONTROL, POC: YES

## 2024-03-15 RX ORDER — AMOXICILLIN AND CLAVULANATE POTASSIUM 875; 125 MG/1; MG/1
1 TABLET, FILM COATED ORAL 2 TIMES DAILY
Qty: 20 TABLET | Refills: 0 | Status: SHIPPED | OUTPATIENT
Start: 2024-03-15 | End: 2024-03-25

## 2024-03-15 ASSESSMENT — ENCOUNTER SYMPTOMS
SINUS PRESSURE: 0
VOICE CHANGE: 1
SINUS PAIN: 0
EYE DISCHARGE: 0
SHORTNESS OF BREATH: 0
COUGH: 0
RHINORRHEA: 1
SORE THROAT: 1

## 2024-03-15 NOTE — PROGRESS NOTES
[facility]  [facility] PRIMARY CARE  [address]  [place] [address]    [facility] PHYSICIANS [facility], [facility] WALK-IN  [address],  [address]  [place] [address]  Dept: [phone]    Mei Vizcarra is a 73 y.o. female Established patient, who presents to the walk-in clinic today with conditions/complaints as noted below:    Chief Complaint   Patient presents with    Sore Throat    Headache    Laryngitis    Drainage     nasal         HPI:     Patient is a 73-year-old female that presents today with concerns for sinus infection. Her symptoms started on Tuesday with a sore throat and post-nasal drainage. Reports headaches, occasional runny nose, and popping in her ears. Denies congestion, sinus pressure/pain, or ear pain/discharge. Notes that she developed voice hoarseness this morning as well. No known fevers; denies chills or body aches. She's taken Tylenol with some relief. No cough.        Past Medical History:   Diagnosis Date    Hyperlipidemia     Hypertension        Current Outpatient Medications   Medication Sig Dispense Refill    amoxicillin-clavulanate (AUGMENTIN) 875-125 MG per tablet Take 1 tablet by mouth 2 times daily for 10 days 20 tablet 0    rosuvastatin (CRESTOR) 40 MG tablet Take 1 tablet by mouth nightly 90 tablet 1    coenzyme Q10 ([facility]S COQ-10) 200 MG CAPS capsule       olmesartan (BENICAR) 5 MG tablet Take 1 tablet by mouth daily Take 1/2 if under 140 systolic 90 tablet 2    timolol (TIMOPTIC) 0.5 % ophthalmic solution 1 drop 2 times daily      latanoprost (XALATAN) 0.005 % ophthalmic solution 1 drop nightly      triamcinolone (NASACORT) 55 MCG/ACT nasal inhaler 2 sprays by Each Nostril route daily      tamsulosin (FLOMAX) 0.4 MG capsule Take 1 capsule by mouth daily for 5 doses 5 capsule 0     No current facility-administered medications for this visit.       Allergies   Allergen Reactions    Doxycycline Hyclate        :

## 2024-03-15 NOTE — PATIENT INSTRUCTIONS
Finish the entire course of antibiotic treatment.  Recommend daily use of Claritin/Zyrtec along with Flonase.  Use Tylenol or Motrin as needed for pain.  Follow-up as needed.

## 2024-03-18 RX ORDER — OLMESARTAN MEDOXOMIL 5 MG/1
5 TABLET ORAL DAILY
Qty: 90 TABLET | Refills: 2 | Status: SHIPPED | OUTPATIENT
Start: 2024-03-18

## 2024-04-01 ENCOUNTER — OFFICE VISIT (OUTPATIENT)
Dept: FAMILY MEDICINE CLINIC | Age: 74
End: 2024-04-01
Payer: MEDICARE

## 2024-04-01 VITALS
HEART RATE: 68 BPM | WEIGHT: 139 LBS | SYSTOLIC BLOOD PRESSURE: 134 MMHG | BODY MASS INDEX: 26.26 KG/M2 | RESPIRATION RATE: 16 BRPM | OXYGEN SATURATION: 99 % | DIASTOLIC BLOOD PRESSURE: 80 MMHG

## 2024-04-01 DIAGNOSIS — J01.01 ACUTE RECURRENT MAXILLARY SINUSITIS: Primary | ICD-10-CM

## 2024-04-01 PROCEDURE — 99213 OFFICE O/P EST LOW 20 MIN: CPT | Performed by: NURSE PRACTITIONER

## 2024-04-01 PROCEDURE — 1123F ACP DISCUSS/DSCN MKR DOCD: CPT | Performed by: NURSE PRACTITIONER

## 2024-04-01 RX ORDER — CEPHALEXIN 500 MG/1
500 CAPSULE ORAL 3 TIMES DAILY
Qty: 30 CAPSULE | Refills: 0 | Status: SHIPPED | OUTPATIENT
Start: 2024-04-01 | End: 2024-04-11

## 2024-04-01 ASSESSMENT — ENCOUNTER SYMPTOMS
COUGH: 1
EYE DISCHARGE: 0
EYE REDNESS: 0
SINUS PAIN: 1
NAUSEA: 0
SHORTNESS OF BREATH: 0
SINUS PRESSURE: 1
SORE THROAT: 1
VOMITING: 0

## 2024-04-01 NOTE — PROGRESS NOTES
[facility] PHYSICIANS [facility] WALK-IN  [address]  [address]  [facility] [address]  Dept: [phone]  Dept Fax: [phone]    Mei Vizcarra is a 73 y.o. female who presents today for her medical conditions/complaints of   Chief Complaint   Patient presents with    Pharyngitis     Finished AUGMENTIN on Saturday     Congestion          HPI:     /80   Pulse 68   Resp 16   Wt 63 kg (139 lb)   SpO2 99%   BMI 26.26 kg/m²       HPI  Pt presented to the clinic today with c/o congestion. The current episode started 2 weeks ago. Was seen here on 3/15 treated with Augmentin.  COVID and Flu when seen was negative. Associated symptoms include: sore throat, post nasal drip, sinus pain/pressure .  Pertinent negatives include: No fever, chills, cough .  Pt has tried Nasocort and Claritin with little improvement. She feels she needs another antibiotic for her sinus infection.      Past Medical History:   Diagnosis Date    Hyperlipidemia     Hypertension         Past Surgical History:   Procedure Laterality Date    APPENDECTOMY      CHOLECYSTECTOMY      HYSTERECTOMY, VAGINAL  10/2022    TONSILLECTOMY         Family History   Problem Relation Age of Onset    Heart Disease Mother     High Cholesterol Mother     Kidney Disease Father        Social History     Tobacco Use    Smoking status: Never    Smokeless tobacco: Never   Substance Use Topics    Alcohol use: Never        Prior to Visit Medications    Medication Sig Taking? Authorizing Provider   cephALEXin (KEFLEX) 500 MG capsule Take 1 capsule by mouth 3 times daily for 10 days Yes Carmela Hurley, APRN - CNP   olmesartan (BENICAR) 5 MG tablet Take 1 tablet by mouth daily Take 1/2 if under 140 systolic Yes Medhkour, Karen, DO   rosuvastatin (CRESTOR) 40 MG tablet Take 1 tablet by mouth nightly Yes Medhkour, Karen, DO   coenzyme Q10 (CVS COQ-10) 200 MG CAPS capsule  Yes Provider, MD Ayush   timolol

## 2024-04-17 SDOH — HEALTH STABILITY: PHYSICAL HEALTH: ON AVERAGE, HOW MANY MINUTES DO YOU ENGAGE IN EXERCISE AT THIS LEVEL?: 30 MIN

## 2024-04-17 SDOH — HEALTH STABILITY: PHYSICAL HEALTH: ON AVERAGE, HOW MANY DAYS PER WEEK DO YOU ENGAGE IN MODERATE TO STRENUOUS EXERCISE (LIKE A BRISK WALK)?: 3 DAYS

## 2024-04-17 ASSESSMENT — LIFESTYLE VARIABLES
HOW MANY STANDARD DRINKS CONTAINING ALCOHOL DO YOU HAVE ON A TYPICAL DAY: 0
HOW OFTEN DO YOU HAVE SIX OR MORE DRINKS ON ONE OCCASION: 1
HOW MANY STANDARD DRINKS CONTAINING ALCOHOL DO YOU HAVE ON A TYPICAL DAY: PATIENT DOES NOT DRINK

## 2024-04-17 ASSESSMENT — PATIENT HEALTH QUESTIONNAIRE - PHQ9
1. LITTLE INTEREST OR PLEASURE IN DOING THINGS: NOT AT ALL
SUM OF ALL RESPONSES TO PHQ QUESTIONS 1-9: 0
2. FEELING DOWN, DEPRESSED OR HOPELESS: NOT AT ALL
SUM OF ALL RESPONSES TO PHQ QUESTIONS 1-9: 0
SUM OF ALL RESPONSES TO PHQ9 QUESTIONS 1 & 2: 0
SUM OF ALL RESPONSES TO PHQ QUESTIONS 1-9: 0
SUM OF ALL RESPONSES TO PHQ QUESTIONS 1-9: 0

## 2024-04-24 ENCOUNTER — HOSPITAL ENCOUNTER (OUTPATIENT)
Age: 74
Setting detail: SPECIMEN
Discharge: HOME | End: 2024-04-24

## 2024-04-24 ENCOUNTER — OFFICE VISIT (OUTPATIENT)
Dept: PRIMARY CARE CLINIC | Age: 74
End: 2024-04-24

## 2024-04-24 VITALS
OXYGEN SATURATION: 98 % | DIASTOLIC BLOOD PRESSURE: 82 MMHG | BODY MASS INDEX: 25.89 KG/M2 | WEIGHT: 137 LBS | SYSTOLIC BLOOD PRESSURE: 130 MMHG | HEART RATE: 57 BPM

## 2024-04-24 DIAGNOSIS — N18.30 STAGE 3 CHRONIC KIDNEY DISEASE, UNSPECIFIED WHETHER STAGE 3A OR 3B CKD (HCC): ICD-10-CM

## 2024-04-24 DIAGNOSIS — Z87.442 HX OF RENAL CALCULI: ICD-10-CM

## 2024-04-24 DIAGNOSIS — Z12.31 ENCOUNTER FOR SCREENING MAMMOGRAM FOR MALIGNANT NEOPLASM OF BREAST: ICD-10-CM

## 2024-04-24 DIAGNOSIS — Z78.0 POST-MENOPAUSAL: ICD-10-CM

## 2024-04-24 DIAGNOSIS — E11.9 TYPE 2 DIABETES MELLITUS WITHOUT COMPLICATION, WITHOUT LONG-TERM CURRENT USE OF INSULIN (HCC): ICD-10-CM

## 2024-04-24 DIAGNOSIS — N13.30 HYDRONEPHROSIS, UNSPECIFIED HYDRONEPHROSIS TYPE: ICD-10-CM

## 2024-04-24 DIAGNOSIS — Z00.00 MEDICARE ANNUAL WELLNESS VISIT, SUBSEQUENT: Primary | ICD-10-CM

## 2024-04-24 LAB
ALBUMIN SERPL-MCNC: 4.6 G/DL (ref 3.5–5.2)
ALBUMIN/GLOB SERPL: 2 {RATIO} (ref 1–2.5)
ALP SERPL-CCNC: 79 U/L (ref 35–104)
ALT SERPL-CCNC: 26 U/L (ref 10–35)
ANION GAP SERPL CALCULATED.3IONS-SCNC: 10 MMOL/L (ref 9–16)
AST SERPL-CCNC: 32 U/L (ref 10–35)
BILIRUB SERPL-MCNC: 0.9 MG/DL (ref 0–1.2)
BUN SERPL-MCNC: 20 MG/DL (ref 8–23)
CALCIUM SERPL-MCNC: 10.1 MG/DL (ref 8.6–10.4)
CHLORIDE SERPL-SCNC: 105 MMOL/L (ref 98–107)
CHOLEST SERPL-MCNC: 148 MG/DL (ref 0–199)
CHOLESTEROL/HDL RATIO: 3
CO2 SERPL-SCNC: 25 MMOL/L (ref 20–31)
CREAT SERPL-MCNC: 1.1 MG/DL (ref 0.5–0.9)
GFR SERPL CREATININE-BSD FRML MDRD: 55 ML/MIN/1.73M2
GLUCOSE SERPL-MCNC: 118 MG/DL (ref 74–99)
HBA1C MFR BLD: 6.8 %
HDLC SERPL-MCNC: 55 MG/DL
LDLC SERPL CALC-MCNC: 62 MG/DL (ref 0–100)
POTASSIUM SERPL-SCNC: 4.6 MMOL/L (ref 3.7–5.3)
PROT SERPL-MCNC: 7.6 G/DL (ref 6.6–8.7)
SODIUM SERPL-SCNC: 140 MMOL/L (ref 136–145)
TRIGL SERPL-MCNC: 155 MG/DL
VLDLC SERPL CALC-MCNC: 31 MG/DL

## 2024-04-24 NOTE — PROGRESS NOTES
Medicare Annual Wellness Visit    Mei Vizcarra is here for Diabetes and Medicare AWV    Assessment & Plan   Medicare annual wellness visit, subsequent  Type 2 diabetes mellitus without complication, without long-term current use of insulin (HCC)  -     POCT glycosylated hemoglobin (Hb A1C)  -      DIABETES FOOT EXAM  -     Lipid Panel; Future  -     Comprehensive Metabolic Panel; Future    - A1c 6.8, recommend to continue to work on diet as pt declines medication. If continues to go up would recommend medication.   Hydronephrosis, unspecified hydronephrosis type- recommend follow up imaging on the hydronephrosis.   -     US RETROPERITONEAL COMPLETE; Future  Stage 3 chronic kidney disease, unspecified whether stage 3a or 3b CKD (HCC)  -     US RETROPERITONEAL COMPLETE; Future  -     Comprehensive Metabolic Panel; Future  Hx of renal calculi  -     US RETROPERITONEAL COMPLETE; Future  Encounter for screening mammogram for malignant neoplasm of breast  -     MARY DIGITAL SCREEN W OR WO CAD BILATERAL; Future  Post-menopausal  -     DEXA BONE DENSITY AXIAL SKELETON; Future    Recommendations for Preventive Services Due: see orders and patient instructions/AVS.  Recommended screening schedule for the next 5-10 years is provided to the patient in written form: see Patient Instructions/AVS.     Return in about 3 months (around 7/24/2024) for diabetes follow up.     Subjective       Patient's complete Health Risk Assessment and screening values have been reviewed and are found in Flowsheets. The following problems were reviewed today and where indicated follow up appointments were made and/or referrals ordered.    Pt was sick with sinus infections last few months. Took two rounds of antibiotics.   Had kidney stones recently as well. When she went to ER had CT that showed obstructing stone and hydronephrosis and hydroureter. Pt notes did not see urology.    A1c up to 6.8    Mild headache lasts 10-15 min when she wakes up

## 2024-04-24 NOTE — PATIENT INSTRUCTIONS
attack. These may include:    Chest pain or pressure, or a strange feeling in the chest.     Sweating.     Shortness of breath.     Pain, pressure, or a strange feeling in the back, neck, jaw, or upper belly or in one or both shoulders or arms.     Lightheadedness or sudden weakness.     A fast or irregular heartbeat.   After you call 911, the  may tell you to chew 1 adult-strength or 2 to 4 low-dose aspirin. Wait for an ambulance. Do not try to drive yourself.  Watch closely for changes in your health, and be sure to contact your doctor if you have any problems.  Where can you learn more?  Go to https://www.[employer].net/patientEd and enter F075 to learn more about \"A Healthy Heart: Care Instructions.\"  Current as of: June 24, 2023               Content Version: 14.0  © 5324-5168 [employer].   Care instructions adapted under license by [employer]. If you have questions about a medical condition or this instruction, always ask your healthcare professional. [employer] disclaims any warranty or liability for your use of this information.      Personalized Preventive Plan for Mei Vizcarra - 4/24/2024  Medicare offers a range of preventive health benefits. Some of the tests and screenings are paid in full while other may be subject to a deductible, co-insurance, and/or copay.    Some of these benefits include a comprehensive review of your medical history including lifestyle, illnesses that may run in your family, and various assessments and screenings as appropriate.    After reviewing your medical record and screening and assessments performed today your provider may have ordered immunizations, labs, imaging, and/or referrals for you.  A list of these orders (if applicable) as well as your Preventive Care list are included within your After Visit Summary for your review.    Other Preventive Recommendations:    A preventive eye exam performed by an eye specialist is recommended

## 2024-04-26 ENCOUNTER — HOSPITAL ENCOUNTER (OUTPATIENT)
Dept: ULTRASOUND IMAGING | Age: 74
End: 2024-04-26
Payer: MEDICARE

## 2024-04-26 DIAGNOSIS — Z87.442 HX OF RENAL CALCULI: ICD-10-CM

## 2024-04-26 DIAGNOSIS — N13.30 HYDRONEPHROSIS, UNSPECIFIED HYDRONEPHROSIS TYPE: ICD-10-CM

## 2024-04-26 DIAGNOSIS — N18.30 STAGE 3 CHRONIC KIDNEY DISEASE, UNSPECIFIED WHETHER STAGE 3A OR 3B CKD (HCC): ICD-10-CM

## 2024-04-26 PROCEDURE — 76770 US EXAM ABDO BACK WALL COMP: CPT

## 2024-04-27 NOTE — RESULT ENCOUNTER NOTE
Kidney function slightly low but stable. Will continue to monitor. Fasting glucose is better. Triglycerides improving as well.

## 2024-05-02 ENCOUNTER — OFFICE VISIT (OUTPATIENT)
Dept: PRIMARY CARE CLINIC | Age: 74
End: 2024-05-02

## 2024-05-02 VITALS
SYSTOLIC BLOOD PRESSURE: 128 MMHG | TEMPERATURE: 97.8 F | BODY MASS INDEX: 26.06 KG/M2 | OXYGEN SATURATION: 94 % | HEART RATE: 60 BPM | HEIGHT: 61 IN | DIASTOLIC BLOOD PRESSURE: 72 MMHG | WEIGHT: 138 LBS

## 2024-05-02 DIAGNOSIS — J02.9 ACUTE PHARYNGITIS, UNSPECIFIED ETIOLOGY: Primary | ICD-10-CM

## 2024-05-02 DIAGNOSIS — J02.9 SORE THROAT: ICD-10-CM

## 2024-05-02 LAB — S PYO AG THROAT QL: NORMAL

## 2024-05-02 RX ORDER — DORZOLAMIDE HYDROCHLORIDE AND TIMOLOL MALEATE 20; 5 MG/ML; MG/ML
SOLUTION/ DROPS OPHTHALMIC
COMMUNITY
Start: 2024-04-27

## 2024-05-02 RX ORDER — AZITHROMYCIN 250 MG/1
TABLET, FILM COATED ORAL
Qty: 6 TABLET | Refills: 0 | Status: SHIPPED | OUTPATIENT
Start: 2024-05-02 | End: 2024-05-12

## 2024-05-02 ASSESSMENT — ENCOUNTER SYMPTOMS
SINUS PRESSURE: 0
SORE THROAT: 1
COUGH: 0

## 2024-05-02 NOTE — PROGRESS NOTES
[facility], [facility] WALK-IN  [address] [address]    [facility] WALK-IN  [address],  [address]  [facility] [address]  Dept: [phone]    Mei Vizcarra is a 73 y.o. female Established patient, who presents to the walk-in clinic today with conditions/complaints as noted below:    Chief Complaint   Patient presents with    Pharyngitis     Sinus infections start with sore throat , started last night         HPI:     Patient is a 73-year-old female that presents today for a sore throat. States that it onset last night and mentions that she's concerned it's going to turn into a sinus infection as she has a history of this occurring. Notes that her  has a sinus infection as well. Reports associated minor runny nose. Denies congestion, sinus pressure, notable post-nasal drainage, ear pain, or cough. She uses Nasacort daily. She's used Claritin-D in the past but doesn't tolerate due to insomnia. No known fevers or chills.        Past Medical History:   Diagnosis Date    Hyperlipidemia     Hypertension        Current Outpatient Medications   Medication Sig Dispense Refill    dorzolamide-timolol (COSOPT) 2-0.5 % ophthalmic solution       azithromycin (ZITHROMAX) 250 MG tablet 500mg on day 1 followed by 250mg on days 2 - 5 6 tablet 0    olmesartan (BENICAR) 5 MG tablet Take 1 tablet by mouth daily Take 1/2 if under 140 systolic 90 tablet 2    rosuvastatin (CRESTOR) 40 MG tablet Take 1 tablet by mouth nightly 90 tablet 1    coenzyme Q10 (CVS COQ-10) 200 MG CAPS capsule       timolol (TIMOPTIC) 0.5 % ophthalmic solution 1 drop 2 times daily      latanoprost (XALATAN) 0.005 % ophthalmic solution 1 drop nightly      triamcinolone (NASACORT) 55 MCG/ACT nasal inhaler 2 sprays by Each Nostril route daily       No current facility-administered medications for this visit.

## 2024-05-02 NOTE — PATIENT INSTRUCTIONS
Continue with supportive treatment measures.  Push hydration and rest.  Continue using Nasacort daily in addition to Claritin.  Antibiotic sent to pharmacy.  Follow-up as needed.

## 2024-05-14 RX ORDER — CEPHALEXIN 250 MG/1
250 CAPSULE ORAL 3 TIMES DAILY
COMMUNITY

## 2024-05-14 RX ORDER — LIDOCAINE AND PRILOCAINE 25; 25 MG/G; MG/G
CREAM TOPICAL PRN
COMMUNITY

## 2024-05-14 RX ORDER — IBUPROFEN 600 MG/1
600 TABLET ORAL EVERY 6 HOURS PRN
COMMUNITY

## 2024-05-14 RX ORDER — ESTRADIOL 0.1 MG/G
2 CREAM VAGINAL DAILY
COMMUNITY

## 2024-05-14 RX ORDER — ASPIRIN 81 MG/1
81 TABLET ORAL DAILY
COMMUNITY

## 2024-05-14 RX ORDER — TRETINOIN 0.5 MG/G
CREAM TOPICAL NIGHTLY
COMMUNITY

## 2024-05-14 RX ORDER — LORATADINE 10 MG/1
10 CAPSULE, LIQUID FILLED ORAL DAILY
COMMUNITY

## 2024-05-21 ENCOUNTER — OFFICE VISIT (OUTPATIENT)
Age: 74
End: 2024-05-21
Payer: MEDICARE

## 2024-05-21 VITALS
SYSTOLIC BLOOD PRESSURE: 118 MMHG | HEIGHT: 61 IN | WEIGHT: 138 LBS | DIASTOLIC BLOOD PRESSURE: 72 MMHG | BODY MASS INDEX: 26.06 KG/M2

## 2024-05-21 DIAGNOSIS — R10.2 VULVAR PAIN: Primary | ICD-10-CM

## 2024-05-21 PROCEDURE — 99213 OFFICE O/P EST LOW 20 MIN: CPT | Performed by: OBSTETRICS & GYNECOLOGY

## 2024-05-21 PROCEDURE — 1123F ACP DISCUSS/DSCN MKR DOCD: CPT | Performed by: OBSTETRICS & GYNECOLOGY

## 2024-05-21 ASSESSMENT — ENCOUNTER SYMPTOMS
SINUS PAIN: 0
COUGH: 0
SORE THROAT: 0
WHEEZING: 0
BACK PAIN: 0
SHORTNESS OF BREATH: 0

## 2024-05-21 NOTE — PROGRESS NOTES
[facility], [facility] UROGYNECOLOGY AND PELVIC REHABILITATION   [address] [place] [address]  Dept: [phone]  Date: 5/21/2024  Patient Name: Mei Vizcarra    VISIT - FOLLOW UP VISIT     CC: had concerns including 1 Year Follow Up.    Chaperone present: Resident    HPI: Patient has had complete resolution of vulvovaginal pain.  No complaints of bowel or bladder dysfunction.  No prolapse.  She is not sexually active.  She has an appointment scheduled for an upcoming annual exam.    Overall state of well-being, dietary and nutritional habits, exercise routines of at least 30 minutes 3 times a week, bowel and bladder function, smoking history, HRT history, sexual activity and partner/s, dyspareunia, and immunizations all revisited if necessary by chart review or direct questioning.    Topics of Prolapse, Pain, Pressure were revisited including type, period of onset, level of severity, quality and quantity, associations, trends, exacerbators, alleviators, bleeding, prolapse to reduce, splinting, and prior treatment / surgery.    Topics of Urinary Leakage were revisited including type, period of onset, level of severity, quality and quantity, associations, trends, exacerbators, alleviators, urgency, frequency, nocturia ,urge incontinence / stress incontinence triggers, hesitancy, obstruction with need to catheterize, frequent UTI\"s >3 in a year diagnosed by culture, hematuria (gross or microscopic), urinary stones, and prior treatment / surgery.    Topics of Fecal Incontinence were revisited including type, period of onset, level of severity, quality and quantity, associations, trends, exacerbators, alleviators, times per day/week, stool quality / quantity, rectal bleeding, hemorrhoids, constipation / Anismus / Proctalgia fugax, laxative abuse, and prior treatment / surgery.    Topics of General Gynecology were revisited including

## 2024-06-04 ENCOUNTER — HOSPITAL ENCOUNTER (OUTPATIENT)
Dept: MAMMOGRAPHY | Age: 74
Discharge: HOME | End: 2024-06-06
Payer: MEDICARE

## 2024-06-04 VITALS — WEIGHT: 137 LBS | HEIGHT: 61 IN | BODY MASS INDEX: 25.86 KG/M2

## 2024-06-04 DIAGNOSIS — Z78.0 POST-MENOPAUSAL: ICD-10-CM

## 2024-06-04 DIAGNOSIS — Z12.31 ENCOUNTER FOR SCREENING MAMMOGRAM FOR MALIGNANT NEOPLASM OF BREAST: ICD-10-CM

## 2024-06-04 PROCEDURE — 77063 BREAST TOMOSYNTHESIS BI: CPT

## 2024-06-04 PROCEDURE — 77080 DXA BONE DENSITY AXIAL: CPT

## 2024-07-03 RX ORDER — ROSUVASTATIN CALCIUM 40 MG/1
40 TABLET, COATED ORAL NIGHTLY
Qty: 90 TABLET | Refills: 1 | Status: SHIPPED | OUTPATIENT
Start: 2024-07-03

## 2024-10-09 ENCOUNTER — TELEPHONE (OUTPATIENT)
Dept: PRIMARY CARE CLINIC | Age: 74
End: 2024-10-09

## 2024-10-09 NOTE — TELEPHONE ENCOUNTER
----- Message from Al LEONARD sent at 10/9/2024  9:16 AM EDT -----  Regarding: ECC Appointment Request  ECC Appointment Request    Patient needs appointment for ECC Appointment Type: Existing Condition Follow Up.    Patient Requested Dates(s): any date in November  but not Tuesdays or Thursdays   Patient Requested Time: morning   Provider Name:Karen Neal DO         Patient scheduled for 6 month follow up on December but would like to have something sooner around November .   Reason for Appointment Request: Established Patient - Available appointments did not meet patient need  --------------------------------------------------------------------------------------------------------------------------    Relationship to Patient: Self     Call Back Information: OK to leave message on voicemail  Preferred Call Back Number: Phone [phone]

## 2024-10-24 ENCOUNTER — OFFICE VISIT (OUTPATIENT)
Dept: FAMILY MEDICINE CLINIC | Age: 74
End: 2024-10-24

## 2024-10-24 VITALS
OXYGEN SATURATION: 98 % | DIASTOLIC BLOOD PRESSURE: 70 MMHG | BODY MASS INDEX: 26.26 KG/M2 | SYSTOLIC BLOOD PRESSURE: 138 MMHG | RESPIRATION RATE: 17 BRPM | HEART RATE: 65 BPM | TEMPERATURE: 96.8 F | WEIGHT: 139 LBS

## 2024-10-24 DIAGNOSIS — R51.9 ACUTE NONINTRACTABLE HEADACHE, UNSPECIFIED HEADACHE TYPE: Primary | ICD-10-CM

## 2024-10-24 DIAGNOSIS — R09.89 LABILE HYPERTENSION: ICD-10-CM

## 2024-10-24 RX ORDER — BUTALBITAL, ACETAMINOPHEN AND CAFFEINE 50; 325; 40 MG/1; MG/1; MG/1
1 TABLET ORAL EVERY 4 HOURS PRN
Qty: 15 TABLET | Refills: 0 | Status: SHIPPED | OUTPATIENT
Start: 2024-10-24

## 2024-10-24 ASSESSMENT — ENCOUNTER SYMPTOMS
SINUS PRESSURE: 0
VOMITING: 0
CHEST TIGHTNESS: 0
NAUSEA: 0
SHORTNESS OF BREATH: 0
BACK PAIN: 0

## 2024-10-24 NOTE — PATIENT INSTRUCTIONS
Keep a daily log of BP readings and send to PCP.  Continue with supportive treatment measures.  Follow-up with PCP.

## 2024-10-24 NOTE — PROGRESS NOTES
[facility], [facility] WALK-IN  [address] [place] [address]    [facility] WALK-IN  [address]  [address]  [facility] [address]  Dept: [phone]    Mei Vizcarra is a 73 y.o. female Established patient, who presents to the walk-in clinic today with conditions/complaints as noted below:    Chief Complaint   Patient presents with    Headache     Pt states that she has had a headache for 1 week.          HPI:     Patient is a 73-year-old female that presents today for recurrent headaches. No documented history of migraines or significant history of headaches. Reports that they've been occurring two to three times a day for the past week and a half and last approximately 15-20 minutes. They're bilateral in the frontal region. Describes pressure and rates it 6-7/10. She's been taking Tylenol which helped initially but has been less effective the past few days. States that she checked her blood pressure yesterday evening and it was 170/80. She's currently on olmesartan 5 mg QD and took an additional 2.5 mg last night. Denies associated lightheadedness, dizziness, pre-syncope/syncope, blurred/double vision, chest pain, heart palpitations, leg edema, or difficulty breathing.        Past Medical History:   Diagnosis Date    Chronic kidney disease, stage 2, mildly decreased GFR     Frequency of urination     Hot flashes     Hyperlipidemia     Hypertension     Incontinence of urine     Urinary retention     UTI (urinary tract infection)        Current Outpatient Medications   Medication Sig Dispense Refill    butalbital-acetaminophen-caffeine (FIORICET, ESGIC) -40 MG per tablet Take 1 tablet by mouth every 4 hours as needed for Headaches Max Daily Amount: 6 tablets 15 tablet 0    rosuvastatin (CRESTOR) 40 MG tablet TAKE ONE TABLET BY MOUTH EVERY EVENING 90 tablet 1

## 2024-10-25 ENCOUNTER — LAB (OUTPATIENT)
Dept: FAMILY MEDICINE CLINIC | Age: 74
End: 2024-10-25

## 2024-10-25 ENCOUNTER — TELEPHONE (OUTPATIENT)
Dept: PRIMARY CARE CLINIC | Age: 74
End: 2024-10-25

## 2024-10-25 VITALS — DIASTOLIC BLOOD PRESSURE: 84 MMHG | SYSTOLIC BLOOD PRESSURE: 140 MMHG

## 2024-10-25 NOTE — PROGRESS NOTES
Patient seen in walk-in for BP check.     Patient /84, was checked 20 minutes later and still reading 140/84    Pt stated she has been using a wrist cuff at home and she has had reads over 198/100 or higher.     Pt reports no dizziness.     Pt states she has had a headache but no longer has one.

## 2024-10-25 NOTE — TELEPHONE ENCOUNTER
Paged due to continued BP elevation  Recommend to be seen in walk in  
Pt seen for MA visit /84.     Walk-in provider notified   
Statement Selected

## 2024-10-25 NOTE — PROGRESS NOTES
I had an extensive telephone conversation with the patient regarding her recent BP readings. As I'd just seen her in the walk-in yesterday for this I recommended that she increase her olmesartan dose to 15 mg QD. Red flag symptoms reviewed and advised to go to the ER immediately.

## 2024-10-28 ENCOUNTER — TELEPHONE (OUTPATIENT)
Dept: PRIMARY CARE CLINIC | Age: 74
End: 2024-10-28

## 2024-10-28 DIAGNOSIS — I10 PRIMARY HYPERTENSION: Primary | ICD-10-CM

## 2024-10-28 NOTE — TELEPHONE ENCOUNTER
Pt is experiencing high BP still after visit to walk in on 10/24. Pt states that walk-in provider increased her BP meds but she is still having high readings between 150-170, along with headaches. Pt has an appt with Dr. GILES on 11/7, but is wondering if PCP is able to get patient in sooner. She is willing to go to another provider.     Writer advised if things get worse, and has any changes in vision, or chest pain to go to the ER immediately.       Please advise

## 2024-10-29 RX ORDER — OLMESARTAN MEDOXOMIL 20 MG/1
20 TABLET ORAL DAILY
Qty: 30 TABLET | Refills: 1 | Status: SHIPPED | OUTPATIENT
Start: 2024-10-29

## 2024-10-29 NOTE — TELEPHONE ENCOUNTER
What dose of the olmesartan is she on currently ? Is she taking 10 mg? If so I can increase it if needed.

## 2024-10-29 NOTE — TELEPHONE ENCOUNTER
Patient notified and agreeable to the olmesartan 20mg.     Patient complaining of intermittent headaches. She was given Fioricet from the walk in but this is providing no relief. Patient asking if there is something else she could try.     Crystal

## 2024-10-29 NOTE — TELEPHONE ENCOUNTER
Spoke with patient and she is currently taking olmesartan three 5mg tablets once daily in the morning. Last night her blood pressure was 176/80 and this morning 137/72. States that her 5 day average has been 150/70. Patient continues with a headache.

## 2024-10-29 NOTE — TELEPHONE ENCOUNTER
I can send a 20 mg pill of the olmesartan so she can just take that if she would like to help with her BP.

## 2024-10-30 NOTE — TELEPHONE ENCOUNTER
I would recommend trying tylenol to see if it helps instead.I have sent the 20 mg olmesartan for her.

## 2024-11-06 SDOH — ECONOMIC STABILITY: FOOD INSECURITY: WITHIN THE PAST 12 MONTHS, THE FOOD YOU BOUGHT JUST DIDN'T LAST AND YOU DIDN'T HAVE MONEY TO GET MORE.: NEVER TRUE

## 2024-11-06 SDOH — ECONOMIC STABILITY: FOOD INSECURITY: WITHIN THE PAST 12 MONTHS, YOU WORRIED THAT YOUR FOOD WOULD RUN OUT BEFORE YOU GOT MONEY TO BUY MORE.: NEVER TRUE

## 2024-11-06 SDOH — ECONOMIC STABILITY: INCOME INSECURITY: HOW HARD IS IT FOR YOU TO PAY FOR THE VERY BASICS LIKE FOOD, HOUSING, MEDICAL CARE, AND HEATING?: NOT HARD AT ALL

## 2024-11-07 ENCOUNTER — HOSPITAL ENCOUNTER (OUTPATIENT)
Age: 74
Setting detail: SPECIMEN
Discharge: HOME | End: 2024-11-07

## 2024-11-07 ENCOUNTER — OFFICE VISIT (OUTPATIENT)
Dept: PRIMARY CARE CLINIC | Age: 74
End: 2024-11-07

## 2024-11-07 VITALS
OXYGEN SATURATION: 98 % | BODY MASS INDEX: 26.38 KG/M2 | RESPIRATION RATE: 16 BRPM | HEART RATE: 62 BPM | DIASTOLIC BLOOD PRESSURE: 80 MMHG | SYSTOLIC BLOOD PRESSURE: 140 MMHG | WEIGHT: 139.6 LBS

## 2024-11-07 DIAGNOSIS — E03.9 HYPOTHYROIDISM, UNSPECIFIED TYPE: ICD-10-CM

## 2024-11-07 DIAGNOSIS — R51.9 SINUS HEADACHE: Primary | ICD-10-CM

## 2024-11-07 DIAGNOSIS — G44.229 CHRONIC TENSION-TYPE HEADACHE, NOT INTRACTABLE: ICD-10-CM

## 2024-11-07 DIAGNOSIS — E11.9 TYPE 2 DIABETES MELLITUS WITHOUT COMPLICATION, WITHOUT LONG-TERM CURRENT USE OF INSULIN (HCC): ICD-10-CM

## 2024-11-07 LAB
ALBUMIN SERPL-MCNC: 4.6 G/DL (ref 3.5–5.2)
ALBUMIN/GLOB SERPL: 1.6 {RATIO} (ref 1–2.5)
ALP SERPL-CCNC: 79 U/L (ref 35–104)
ALT SERPL-CCNC: 102 U/L (ref 10–35)
ANION GAP SERPL CALCULATED.3IONS-SCNC: 9 MMOL/L (ref 9–16)
AST SERPL-CCNC: 58 U/L (ref 10–35)
BILIRUB SERPL-MCNC: 0.4 MG/DL (ref 0–1.2)
BUN SERPL-MCNC: 19 MG/DL (ref 8–23)
CALCIUM SERPL-MCNC: 10.3 MG/DL (ref 8.6–10.4)
CHLORIDE SERPL-SCNC: 106 MMOL/L (ref 98–107)
CO2 SERPL-SCNC: 25 MMOL/L (ref 20–31)
CREAT SERPL-MCNC: 0.9 MG/DL (ref 0.6–0.9)
GFR, ESTIMATED: 68 ML/MIN/1.73M2
GLUCOSE SERPL-MCNC: 108 MG/DL (ref 74–99)
HBA1C MFR BLD: 6.9 %
POTASSIUM SERPL-SCNC: 4.9 MMOL/L (ref 3.7–5.3)
PROT SERPL-MCNC: 7.5 G/DL (ref 6.6–8.7)
SODIUM SERPL-SCNC: 140 MMOL/L (ref 136–145)
TSH SERPL DL<=0.05 MIU/L-ACNC: 4.17 UIU/ML (ref 0.27–4.2)

## 2024-11-07 RX ORDER — LEVOCETIRIZINE DIHYDROCHLORIDE 5 MG/1
5 TABLET, FILM COATED ORAL NIGHTLY
Qty: 30 TABLET | Refills: 0 | Status: SHIPPED | OUTPATIENT
Start: 2024-11-07

## 2024-11-07 ASSESSMENT — PATIENT HEALTH QUESTIONNAIRE - PHQ9
SUM OF ALL RESPONSES TO PHQ QUESTIONS 1-9: 0
2. FEELING DOWN, DEPRESSED OR HOPELESS: NOT AT ALL
SUM OF ALL RESPONSES TO PHQ QUESTIONS 1-9: 0
SUM OF ALL RESPONSES TO PHQ9 QUESTIONS 1 & 2: 0
1. LITTLE INTEREST OR PLEASURE IN DOING THINGS: NOT AT ALL
2. FEELING DOWN, DEPRESSED OR HOPELESS: NOT AT ALL
SUM OF ALL RESPONSES TO PHQ QUESTIONS 1-9: 0
SUM OF ALL RESPONSES TO PHQ QUESTIONS 1-9: 0
SUM OF ALL RESPONSES TO PHQ9 QUESTIONS 1 & 2: 0
SUM OF ALL RESPONSES TO PHQ QUESTIONS 1-9: 0
SUM OF ALL RESPONSES TO PHQ QUESTIONS 1-9: 0
1. LITTLE INTEREST OR PLEASURE IN DOING THINGS: NOT AT ALL

## 2024-11-07 NOTE — PROGRESS NOTES
BP concerns - brought in listed readings from home.  Headaches - right eye - sinus area, dull, 5-7/10 pain, (worry factor high)  \"Incapacitating\"; relieved by rest, sleep. No neuro changes - etc  Used tylenol - no relief  Recently saw her eye doctor eye  -known history of glaucoma (ocular pressures were at one time elevated to 50 but recently reduced to 15)  Very nervous, anxious      Negative for:     Headache  Dizziness  Visual Disturbance  Hearing Changes  Nasal / sinus Symptoms  Mouth / tooth symptom, pain  Throat pain  Difficulty swallowing  Neck pain  Chest discomfort  Cough  SOB  N/V/D/C  Pelvic area discomfort  Bladder / voiding discomfort  Bowel complaints  MSk complaints   Numbness/tingling/abnormal sensations   Edema / Leg swelling  Dizziness  Fatigue  Bleeding   Skin    Pertinent Pos: See HPI - Worry / mood complaints    Vitals:    11/07/24 0926   BP: (!) 140/80   Pulse: 62   Resp: 16   SpO2: 98%       BP reading - manual - seated - left arm, adult cuff - 130/80      Alert and oriented to PPT  NAD    HEENT - neg  Neck - no bruits, no lymphadenopathy  Chest  HRRR w/o murmer  LCTAB no wheezes / rhonchi    Gait / Station - stable, no dysequilibrium, uniform pace, no assist device, cane.     Diagnosis Orders   1. Sinus headache  levocetirizine (XYZAL) 5 MG tablet      2. Hypothyroidism, unspecified type  TSH      3. Chronic tension-type headache, not intractable  Comprehensive Metabolic Panel      4. Type 2 diabetes mellitus without complication, without long-term current use of insulin ([facility])  POCT glycosylated hemoglobin (Hb A1C)        POCT A1c today-6.9 (to discuss with her PCP)    Plan:  1.)  Temporary add-discussed xyzal trial for sinus related headache -advised that to take claritin at the same time to differentiate benefit.  2.)  Patient anxious would like to get back in to her PCP schedule  3.)  Follow-up in 2 weeks with Dr GRAMAJO

## 2024-11-14 DIAGNOSIS — R74.8 ELEVATED LIVER ENZYMES: Primary | ICD-10-CM

## 2024-11-21 ENCOUNTER — HOSPITAL ENCOUNTER (OUTPATIENT)
Age: 74
Setting detail: SPECIMEN
Discharge: HOME | End: 2024-11-21

## 2024-11-21 DIAGNOSIS — R74.8 ELEVATED LIVER ENZYMES: ICD-10-CM

## 2024-11-21 LAB
ALBUMIN SERPL-MCNC: 4.4 G/DL (ref 3.5–5.2)
ALBUMIN/GLOB SERPL: 1.5 {RATIO} (ref 1–2.5)
ALP SERPL-CCNC: 74 U/L (ref 35–104)
ALT SERPL-CCNC: 85 U/L (ref 10–35)
ANION GAP SERPL CALCULATED.3IONS-SCNC: 11 MMOL/L (ref 9–16)
AST SERPL-CCNC: 53 U/L (ref 10–35)
BILIRUB SERPL-MCNC: 0.6 MG/DL (ref 0–1.2)
BUN SERPL-MCNC: 25 MG/DL (ref 8–23)
CALCIUM SERPL-MCNC: 10.3 MG/DL (ref 8.6–10.4)
CHLORIDE SERPL-SCNC: 106 MMOL/L (ref 98–107)
CO2 SERPL-SCNC: 24 MMOL/L (ref 20–31)
CREAT SERPL-MCNC: 0.9 MG/DL (ref 0.6–0.9)
GFR, ESTIMATED: 68 ML/MIN/1.73M2
GLUCOSE SERPL-MCNC: 115 MG/DL (ref 74–99)
POTASSIUM SERPL-SCNC: 5 MMOL/L (ref 3.7–5.3)
PROT SERPL-MCNC: 7.3 G/DL (ref 6.6–8.7)
SODIUM SERPL-SCNC: 141 MMOL/L (ref 136–145)

## 2024-11-26 ENCOUNTER — OFFICE VISIT (OUTPATIENT)
Dept: PRIMARY CARE CLINIC | Age: 74
End: 2024-11-26

## 2024-11-26 VITALS
BODY MASS INDEX: 26.6 KG/M2 | OXYGEN SATURATION: 99 % | DIASTOLIC BLOOD PRESSURE: 84 MMHG | SYSTOLIC BLOOD PRESSURE: 164 MMHG | WEIGHT: 140.8 LBS | HEART RATE: 65 BPM

## 2024-11-26 DIAGNOSIS — I10 PRIMARY HYPERTENSION: ICD-10-CM

## 2024-11-26 DIAGNOSIS — R94.5 ABNORMAL RESULTS OF LIVER FUNCTION STUDIES: ICD-10-CM

## 2024-11-26 DIAGNOSIS — R74.8 ELEVATED LIVER ENZYMES: Primary | ICD-10-CM

## 2024-11-26 DIAGNOSIS — E11.22 TYPE 2 DIABETES MELLITUS WITH CHRONIC KIDNEY DISEASE, WITHOUT LONG-TERM CURRENT USE OF INSULIN, UNSPECIFIED CKD STAGE (HCC): ICD-10-CM

## 2024-11-26 DIAGNOSIS — M79.651 RIGHT THIGH PAIN: ICD-10-CM

## 2024-11-26 DIAGNOSIS — Z12.11 SCREENING FOR COLON CANCER: ICD-10-CM

## 2024-11-26 RX ORDER — OLMESARTAN MEDOXOMIL 40 MG/1
40 TABLET ORAL DAILY
Qty: 30 TABLET | Refills: 5 | Status: SHIPPED | OUTPATIENT
Start: 2024-11-26

## 2024-11-26 ASSESSMENT — ENCOUNTER SYMPTOMS
SHORTNESS OF BREATH: 0
VOMITING: 0

## 2024-11-26 NOTE — PROGRESS NOTES
[facility]  [facility] PRIMARY CARE  [address] [place] [address]   [address]  [facility] [address]  Dept: [phone]  Dept Fax: [phone]    Mei Vizcarra is a 73 y.o. female who presents today for her medical conditions/complaints as noted below.  Mei Vizcarra is c/o of  Chief Complaint   Patient presents with    Hypertension     Ongoing HTN & BETANCUR, saw Dr. Caraballo and was given an allergy medication & increased HTN meds, pt reports HA has subsided & BP has come down some, still averaging around a SBP of 150         HPI:     HPI    Pt here for follow up on BP.  Was seen few weeks ago for elevated BP and headache. Her olmesartan was increased and started on xyzal for possible sinus headache.   Headache has been better.   Olmesartan was increased again through urgent care. She was up to 20 mg but BP still is elevated. In the 150s systolic at home and elevated in 160s today in the office.   She also had elevated liver enzymes - repeat improved somewhat. Denies any etoh use or any new OTC medications.       A1c was 6.9 as well and she is open to trying medication. Will avoid metformin given her CKD since Cr and GFR have been abnormal previously.     Has been doing chair yoga- did pigeon position and R leg was hurting a lot . Has been going on for a month. Last week is better but still hurts. Feels like pain is deep inside the thigh. Not currently ttp.         Hemoglobin A1C (%)   Date Value   11/07/2024 6.9   04/24/2024 6.8   10/24/2023 6.4             ( goal A1C is < 7)   No components found for: \"LABMICR\"  No components found for: \"LDLCHOLESTEROL\", \"LDLCALC\"    (goal LDL is <100)   AST (U/L)   Date Value   11/21/2024 53 (H)     ALT (U/L)   Date Value   11/21/2024 85 (H)     BUN (mg/dL)   Date Value   11/21/2024 25 (H)     BP Readings from Last 3 Encounters:   11/26/24 (!) 164/84   11/07/24 (!) 140/80   10/25/24 (!) 140/84          (goal 120/80)    Past Medical History:   Diagnosis Date    Chronic

## 2024-12-03 ENCOUNTER — TELEPHONE (OUTPATIENT)
Dept: PRIMARY CARE CLINIC | Age: 74
End: 2024-12-03

## 2024-12-10 LAB — NONINV COLON CA DNA+OCC BLD SCRN STL QL: NEGATIVE

## 2024-12-19 ENCOUNTER — TELEPHONE (OUTPATIENT)
Dept: PRIMARY CARE CLINIC | Age: 74
End: 2024-12-19

## 2024-12-19 DIAGNOSIS — I10 PRIMARY HYPERTENSION: Primary | ICD-10-CM

## 2024-12-19 RX ORDER — AMLODIPINE BESYLATE 2.5 MG/1
2.5 TABLET ORAL DAILY
Qty: 90 TABLET | Refills: 1 | Status: SHIPPED | OUTPATIENT
Start: 2024-12-19

## 2024-12-19 NOTE — TELEPHONE ENCOUNTER
Patient states her blood pressure continues to be elevated with systolic readings in the 150s/160s. Denies shortness of breath, chest pain.. At her last appointment in November Olemsartan was increased to 40 mg daily.

## 2025-01-02 ENCOUNTER — HOSPITAL ENCOUNTER (OUTPATIENT)
Age: 75
Discharge: HOME | End: 2025-01-04
Payer: MEDICARE

## 2025-01-02 ENCOUNTER — HOSPITAL ENCOUNTER (OUTPATIENT)
Dept: GENERAL RADIOLOGY | Age: 75
Discharge: HOME | End: 2025-01-04
Payer: MEDICARE

## 2025-01-02 DIAGNOSIS — M79.651 RIGHT THIGH PAIN: ICD-10-CM

## 2025-01-02 PROCEDURE — 73552 X-RAY EXAM OF FEMUR 2/>: CPT

## 2025-01-02 RX ORDER — ROSUVASTATIN CALCIUM 40 MG/1
40 TABLET, COATED ORAL NIGHTLY
Qty: 90 TABLET | Refills: 1 | Status: SHIPPED | OUTPATIENT
Start: 2025-01-02

## 2025-01-23 ENCOUNTER — HOSPITAL ENCOUNTER (OUTPATIENT)
Age: 75
Discharge: HOME | End: 2025-01-23
Payer: MEDICARE

## 2025-01-23 DIAGNOSIS — R74.8 ELEVATED LIVER ENZYMES: ICD-10-CM

## 2025-01-23 DIAGNOSIS — R94.5 ABNORMAL RESULTS OF LIVER FUNCTION STUDIES: ICD-10-CM

## 2025-01-23 DIAGNOSIS — E11.22 TYPE 2 DIABETES MELLITUS WITH CHRONIC KIDNEY DISEASE, WITHOUT LONG-TERM CURRENT USE OF INSULIN, UNSPECIFIED CKD STAGE (HCC): ICD-10-CM

## 2025-01-23 LAB
ALBUMIN SERPL-MCNC: 4.5 G/DL (ref 3.5–5.2)
ALBUMIN/GLOB SERPL: 1.5 {RATIO} (ref 1–2.5)
ALP SERPL-CCNC: 72 U/L (ref 35–104)
ALT SERPL-CCNC: 54 U/L (ref 10–35)
AST SERPL-CCNC: 44 U/L (ref 10–35)
BILIRUB DIRECT SERPL-MCNC: 0.3 MG/DL (ref 0–0.2)
BILIRUB INDIRECT SERPL-MCNC: 0.6 MG/DL (ref 0–1)
BILIRUB SERPL-MCNC: 0.9 MG/DL (ref 0–1.2)
CREAT UR-MCNC: 17 MG/DL (ref 28–217)
FERRITIN SERPL-MCNC: 99 NG/ML
GLOBULIN SER CALC-MCNC: 3.1 G/DL
HAV IGM SERPL QL IA: NONREACTIVE
HBV CORE IGM SERPL QL IA: NONREACTIVE
HBV SURFACE AG SERPL QL IA: NONREACTIVE
HCV AB SERPL QL IA: NONREACTIVE
IRON SATN MFR SERPL: 31 % (ref 20–55)
IRON SERPL-MCNC: 96 UG/DL (ref 37–145)
MICROALBUMIN UR-MCNC: 13 MG/L (ref 0–20)
MICROALBUMIN/CREAT UR-RTO: 77 MCG/MG CREAT (ref 0–25)
PROT SERPL-MCNC: 7.6 G/DL (ref 6.6–8.7)
TIBC SERPL-MCNC: 307 UG/DL (ref 250–450)
UNSATURATED IRON BINDING CAPACITY: 211 UG/DL (ref 112–347)

## 2025-01-23 PROCEDURE — 83540 ASSAY OF IRON: CPT

## 2025-01-23 PROCEDURE — 80076 HEPATIC FUNCTION PANEL: CPT

## 2025-01-23 PROCEDURE — 80074 ACUTE HEPATITIS PANEL: CPT

## 2025-01-23 PROCEDURE — 36415 COLL VENOUS BLD VENIPUNCTURE: CPT

## 2025-01-23 PROCEDURE — 82043 UR ALBUMIN QUANTITATIVE: CPT

## 2025-01-23 PROCEDURE — 82570 ASSAY OF URINE CREATININE: CPT

## 2025-01-23 PROCEDURE — 82728 ASSAY OF FERRITIN: CPT

## 2025-01-23 PROCEDURE — 83550 IRON BINDING TEST: CPT

## 2025-01-26 SDOH — ECONOMIC STABILITY: INCOME INSECURITY: IN THE LAST 12 MONTHS, WAS THERE A TIME WHEN YOU WERE NOT ABLE TO PAY THE MORTGAGE OR RENT ON TIME?: NO

## 2025-01-26 SDOH — ECONOMIC STABILITY: FOOD INSECURITY: WITHIN THE PAST 12 MONTHS, THE FOOD YOU BOUGHT JUST DIDN'T LAST AND YOU DIDN'T HAVE MONEY TO GET MORE.: NEVER TRUE

## 2025-01-26 SDOH — ECONOMIC STABILITY: FOOD INSECURITY: WITHIN THE PAST 12 MONTHS, YOU WORRIED THAT YOUR FOOD WOULD RUN OUT BEFORE YOU GOT MONEY TO BUY MORE.: NEVER TRUE

## 2025-01-26 ASSESSMENT — PATIENT HEALTH QUESTIONNAIRE - PHQ9
SUM OF ALL RESPONSES TO PHQ QUESTIONS 1-9: 0
2. FEELING DOWN, DEPRESSED OR HOPELESS: NOT AT ALL
2. FEELING DOWN, DEPRESSED OR HOPELESS: NOT AT ALL
1. LITTLE INTEREST OR PLEASURE IN DOING THINGS: NOT AT ALL
1. LITTLE INTEREST OR PLEASURE IN DOING THINGS: NOT AT ALL
SUM OF ALL RESPONSES TO PHQ QUESTIONS 1-9: 0
SUM OF ALL RESPONSES TO PHQ QUESTIONS 1-9: 0
SUM OF ALL RESPONSES TO PHQ9 QUESTIONS 1 & 2: 0
SUM OF ALL RESPONSES TO PHQ QUESTIONS 1-9: 0
SUM OF ALL RESPONSES TO PHQ9 QUESTIONS 1 & 2: 0

## 2025-01-29 ENCOUNTER — OFFICE VISIT (OUTPATIENT)
Dept: PRIMARY CARE CLINIC | Age: 75
End: 2025-01-29

## 2025-01-29 VITALS
BODY MASS INDEX: 26.64 KG/M2 | HEART RATE: 67 BPM | DIASTOLIC BLOOD PRESSURE: 80 MMHG | WEIGHT: 141 LBS | SYSTOLIC BLOOD PRESSURE: 138 MMHG | OXYGEN SATURATION: 96 %

## 2025-01-29 DIAGNOSIS — M79.651 RIGHT THIGH PAIN: ICD-10-CM

## 2025-01-29 DIAGNOSIS — N18.30 STAGE 3 CHRONIC KIDNEY DISEASE, UNSPECIFIED WHETHER STAGE 3A OR 3B CKD (HCC): ICD-10-CM

## 2025-01-29 DIAGNOSIS — R74.8 ELEVATED LIVER ENZYMES: ICD-10-CM

## 2025-01-29 DIAGNOSIS — J01.90 ACUTE NON-RECURRENT SINUSITIS, UNSPECIFIED LOCATION: ICD-10-CM

## 2025-01-29 DIAGNOSIS — E11.22 TYPE 2 DIABETES MELLITUS WITH CHRONIC KIDNEY DISEASE, WITHOUT LONG-TERM CURRENT USE OF INSULIN, UNSPECIFIED CKD STAGE (HCC): Primary | ICD-10-CM

## 2025-01-29 DIAGNOSIS — I10 PRIMARY HYPERTENSION: ICD-10-CM

## 2025-01-29 DIAGNOSIS — M79.601 RIGHT ARM PAIN: ICD-10-CM

## 2025-01-29 ASSESSMENT — ENCOUNTER SYMPTOMS
SHORTNESS OF BREATH: 0
VOMITING: 0

## 2025-01-29 NOTE — PROGRESS NOTES
[facility]  [facility] PRIMARY CARE  [address] [place] [address] DR  [address]  [facility] [address]  Dept: [phone]  Dept Fax: [phone]    Mei Vizcarra is a 74 y.o. female who presents today for her medical conditions/complaints as noted below.  Mei Vizcarra is c/o of  Chief Complaint   Patient presents with    Diabetes     Too soon for A1C, januvia pt took for a month but made her feel very lethargic    Health Maintenance     HCC, CRC, AWV    Hypertension     2mo f/u         HPI:     HPI    Pt here for follow up on chronic conditions.     Started her on januvia last visit as her A1c was 6.9- notes she stopped it due to making her tired and lethargic.    BP has been up and down. Notes when it is In the 120s she does not take any BP meds. This am was 150s so she took her olmesartan 40 and amlodipine 2.5.      3-4 weeks of sinus pressure/congestion- has been using nasacort.     Still with the pain//dull ache in R arm - feels ache in her R thigh as well. Denies sciatic and pain is localized to middle of R thigh. Xray femur was normal. She has been avoiding heavy lifting but still feels it in her R arm- denies any shoulder pain and ROM is good.         Hemoglobin A1C (%)   Date Value   11/07/2024 6.9   04/24/2024 6.8   10/24/2023 6.4             ( goal A1C is < 7)   No components found for: \"LABMICR\"  No components found for: \"LDLCHOLESTEROL\", \"LDLCALC\"    (goal LDL is <100)   AST (U/L)   Date Value   01/23/2025 44 (H)     ALT (U/L)   Date Value   01/23/2025 54 (H)     BUN (mg/dL)   Date Value   11/21/2024 25 (H)     BP Readings from Last 3 Encounters:   01/29/25 138/80   11/26/24 (!) 164/84   11/07/24 (!) 140/80          (goal 120/80)    Past Medical History:   Diagnosis Date    Chronic kidney disease, stage 2, mildly decreased GFR     Frequency of urination     Hot flashes     Hyperlipidemia     Hypertension     Incontinence of urine     Urinary retention     UTI (urinary tract infection)

## 2025-02-05 ENCOUNTER — HOSPITAL ENCOUNTER (OUTPATIENT)
Age: 75
Setting detail: SPECIMEN
Discharge: HOME | End: 2025-02-05

## 2025-02-05 ENCOUNTER — OFFICE VISIT (OUTPATIENT)
Dept: PRIMARY CARE CLINIC | Age: 75
End: 2025-02-05

## 2025-02-05 VITALS
BODY MASS INDEX: 25.86 KG/M2 | OXYGEN SATURATION: 98 % | HEIGHT: 61 IN | DIASTOLIC BLOOD PRESSURE: 80 MMHG | WEIGHT: 137 LBS | SYSTOLIC BLOOD PRESSURE: 142 MMHG | HEART RATE: 63 BPM | RESPIRATION RATE: 18 BRPM

## 2025-02-05 DIAGNOSIS — M79.674 GREAT TOE PAIN, RIGHT: ICD-10-CM

## 2025-02-05 DIAGNOSIS — M77.9 INFLAMMATION AROUND JOINT: ICD-10-CM

## 2025-02-05 DIAGNOSIS — M79.674 GREAT TOE PAIN, RIGHT: Primary | ICD-10-CM

## 2025-02-05 LAB
BASOPHILS # BLD: 0.04 K/UL (ref 0–0.2)
BASOPHILS NFR BLD: 0 % (ref 0–2)
CRP SERPL HS-MCNC: 5 MG/L (ref 0–5)
EOSINOPHIL # BLD: 0.24 K/UL (ref 0–0.44)
EOSINOPHILS RELATIVE PERCENT: 3 % (ref 1–4)
ERYTHROCYTE [DISTWIDTH] IN BLOOD BY AUTOMATED COUNT: 13.5 % (ref 11.8–14.4)
ERYTHROCYTE [SEDIMENTATION RATE] IN BLOOD BY PHOTOMETRIC METHOD: 31 MM/HR (ref 0–30)
HCT VFR BLD AUTO: 46.7 % (ref 36.3–47.1)
HGB BLD-MCNC: 14.3 G/DL (ref 11.9–15.1)
IMM GRANULOCYTES # BLD AUTO: 0.04 K/UL (ref 0–0.3)
IMM GRANULOCYTES NFR BLD: 0 %
LYMPHOCYTES NFR BLD: 2.68 K/UL (ref 1.1–3.7)
LYMPHOCYTES RELATIVE PERCENT: 28 % (ref 24–43)
MCH RBC QN AUTO: 28.5 PG (ref 25.2–33.5)
MCHC RBC AUTO-ENTMCNC: 30.6 G/DL (ref 28.4–34.8)
MCV RBC AUTO: 93 FL (ref 82.6–102.9)
MONOCYTES NFR BLD: 0.81 K/UL (ref 0.1–1.2)
MONOCYTES NFR BLD: 8 % (ref 3–12)
NEUTROPHILS NFR BLD: 61 % (ref 36–65)
NEUTS SEG NFR BLD: 5.92 K/UL (ref 1.5–8.1)
NRBC BLD-RTO: 0 PER 100 WBC
PLATELET # BLD AUTO: 512 K/UL (ref 138–453)
PMV BLD AUTO: 10.4 FL (ref 8.1–13.5)
RBC # BLD AUTO: 5.02 M/UL (ref 3.95–5.11)
URATE SERPL-MCNC: 5.4 MG/DL (ref 2.4–5.7)
WBC OTHER # BLD: 9.7 K/UL (ref 3.5–11.3)

## 2025-02-05 ASSESSMENT — ENCOUNTER SYMPTOMS: COLOR CHANGE: 1

## 2025-02-05 NOTE — PROGRESS NOTES
[facility]ANS [facility],  WALK-IN  [address],  [address]  [facility] [address]  Dept: [phone]    Mei Vizcarra is a 74 y.o. female Established patient, who presents to the walk-in clinic today with conditions/complaints as noted below:    Chief Complaint   Patient presents with    Foot Pain     Right side - no trauma          HPI:     HPI      Mei presents to the office today with concerns of pain and redness to her right toe. Pain started last night. Denies injury. It has never happened before. Denies history of gout. Her father had gout. She does have some small bunions. She has been home for the past week wearing slippers because she has been ill with a cold.   Past Medical History:   Diagnosis Date    Chronic kidney disease, stage 2, mildly decreased GFR     Frequency of urination     Hot flashes     Hyperlipidemia     Hypertension     Incontinence of urine     Urinary retention     UTI (urinary tract infection)        Current Outpatient Medications   Medication Sig Dispense Refill    amoxicillin-clavulanate (AUGMENTIN) 875-125 MG per tablet Take 1 tablet by mouth 2 times daily for 7 days 14 tablet 0    rosuvastatin (CRESTOR) 40 MG tablet TAKE ONE TABLET BY MOUTH EVERY EVENING 90 tablet 1    amLODIPine (NORVASC) 2.5 MG tablet Take 1 tablet by mouth daily 90 tablet 1    olmesartan (BENICAR) 40 MG tablet Take 1 tablet by mouth daily 30 tablet 5    levocetirizine (XYZAL) 5 MG tablet Take 1 tablet by mouth nightly 30 tablet 0    butalbital-acetaminophen-caffeine (FIORICET, ESGIC) -40 MG per tablet Take 1 tablet by mouth every 4 hours as needed for Headaches Max Daily Amount: 6 tablets 15 tablet 0    loratadine (CLARITIN) 10 MG capsule Take 1 capsule by mouth daily      dorzolamide-timolol (COSOPT) 2-0.5 % ophthalmic solution       coenzyme Q10 ([employer] COQ-10) 200 MG CAPS capsule       timolol (TIMOPTIC) 0.5 % ophthalmic solution 1 drop 2

## 2025-02-05 NOTE — RESULT ENCOUNTER NOTE
Blood work essentially normal. Platelets are a little high, recommend increase water intake. No signs of gout in blood work.

## 2025-02-06 ENCOUNTER — HOSPITAL ENCOUNTER (EMERGENCY)
Age: 75
Discharge: HOME | End: 2025-02-06
Attending: EMERGENCY MEDICINE
Payer: MEDICARE

## 2025-02-06 VITALS
HEIGHT: 61 IN | DIASTOLIC BLOOD PRESSURE: 79 MMHG | WEIGHT: 137 LBS | TEMPERATURE: 97.7 F | OXYGEN SATURATION: 96 % | SYSTOLIC BLOOD PRESSURE: 168 MMHG | BODY MASS INDEX: 25.86 KG/M2 | HEART RATE: 61 BPM | RESPIRATION RATE: 16 BRPM

## 2025-02-06 DIAGNOSIS — M10.9 GOUT OF RIGHT FOOT, UNSPECIFIED CAUSE, UNSPECIFIED CHRONICITY: Primary | ICD-10-CM

## 2025-02-06 PROCEDURE — 99283 EMERGENCY DEPT VISIT LOW MDM: CPT | Performed by: EMERGENCY MEDICINE

## 2025-02-06 PROCEDURE — 6370000000 HC RX 637 (ALT 250 FOR IP): Performed by: NURSE PRACTITIONER

## 2025-02-06 RX ORDER — PREDNISONE 20 MG/1
60 TABLET ORAL ONCE
Status: COMPLETED | OUTPATIENT
Start: 2025-02-06 | End: 2025-02-06

## 2025-02-06 RX ORDER — PREDNISONE 20 MG/1
60 TABLET ORAL DAILY
Qty: 15 TABLET | Refills: 0 | Status: SHIPPED | OUTPATIENT
Start: 2025-02-06 | End: 2025-02-11

## 2025-02-06 RX ADMIN — PREDNISONE 60 MG: 20 TABLET ORAL at 12:54

## 2025-02-06 ASSESSMENT — PAIN - FUNCTIONAL ASSESSMENT: PAIN_FUNCTIONAL_ASSESSMENT: 0-10

## 2025-02-06 ASSESSMENT — PAIN DESCRIPTION - PAIN TYPE: TYPE: ACUTE PAIN

## 2025-02-06 ASSESSMENT — PAIN SCALES - GENERAL: PAINLEVEL_OUTOF10: 7

## 2025-02-06 NOTE — ED PROVIDER NOTES
[facility] EMERGENCY DEPARTMENT  eMERGENCY dEPARTMENT eNCOUnter   Attending Physician Attestation    Pt Name: Mei Vizcarra  MRN: [id number]  Birthdate 1950  Date of evaluation: 2/6/25        I personally made/approves the management plan for this patient's and take responsibility for the patient management.      (Please note that portions of this note were completed with a voice recognition program.  Efforts were made to edit the dictations but occasionally words are mis-transcribed.)    Murray Awan DO  Attending Emergency  Physician                Murray Awan DO  02/06/25 1232

## 2025-02-06 NOTE — ED PROVIDER NOTES
[facility] EMERGENCY DEPARTMENT  EMERGENCY DEPARTMENT ENCOUNTER      Pt Name: Mei Vizcarra  MRN: [id number]  Birthdate 1950  Date of evaluation: 2/6/2025  Provider: VINCENZO Aaron CNP  4:56 PM    CHIEF COMPLAINT       Chief Complaint   Patient presents with    Foot Pain     Right foot- at base of great toe- UC yesterday- labs/imaging to rule out gout.         HISTORY OF PRESENT ILLNESS    Mei Vizcarra is a 74 y.o. female who presents to the emergency department for evaluation of pain to the right great toe.  Patient states that she has had pain for couple of days.  She went to urgent care yesterday we had an x-ray done and labs drawn and was told that it was not gout because her uric acid was low.  She is here because the pain is not controlled with Tylenol.  She cannot take Advil.  Redness and warmth to the medial aspect of the right first metacarpal    HPI    Nursing Notes were reviewed.    REVIEW OF SYSTEMS       Review of Systems   All other systems reviewed and are negative.      Except as noted above the remainder of the review of systems was reviewed and negative.       PAST MEDICAL HISTORY     Past Medical History:   Diagnosis Date    Chronic kidney disease, stage 2, mildly decreased GFR     Frequency of urination     Hot flashes     Hyperlipidemia     Hypertension     Incontinence of urine     Urinary retention     UTI (urinary tract infection)          SURGICAL HISTORY       Past Surgical History:   Procedure Laterality Date    APPENDECTOMY      CATARACT REMOVAL Right     CHOLECYSTECTOMY      HYSTERECTOMY, VAGINAL  10/2022    TONSILLECTOMY  1955         CURRENT MEDICATIONS       Discharge Medication List as of 2/6/2025 12:55 PM        CONTINUE these medications which have NOT CHANGED    Details   rosuvastatin (CRESTOR) 40 MG tablet TAKE ONE TABLET BY MOUTH EVERY EVENING, Disp-90 tablet, R-1Normal      amLODIPine (NORVASC) 2.5 MG tablet Take 1 tablet by mouth daily, Disp-90 tablet,  VINCENZO Bach CNP (electronically signed)             Micaela Bach APRN - CNP  02/06/25 0222

## 2025-02-06 NOTE — DISCHARGE INSTRUCTIONS
Home.  Prednisone as prescribed to help with inflammation.  Rest and elevate ice if you can.  Follow-up with your orthopedic provider on 2/10 as planned.    Return to the emergency department for redness that spreads, streaking up the leg, fevers, worsening symptoms in any way, or any other concerns.    Your blood sugars will be a little elevated due to the prednisone.  Please let your primary care provider know this

## 2025-02-07 SDOH — HEALTH STABILITY: PHYSICAL HEALTH
ON AVERAGE, HOW MANY DAYS PER WEEK DO YOU ENGAGE IN MODERATE TO STRENUOUS EXERCISE (LIKE A BRISK WALK)?: PATIENT DECLINED

## 2025-02-10 ENCOUNTER — OFFICE VISIT (OUTPATIENT)
Dept: ORTHOPEDIC SURGERY | Age: 75
End: 2025-02-10
Payer: MEDICARE

## 2025-02-10 VITALS — BODY MASS INDEX: 26.43 KG/M2 | HEIGHT: 61 IN | WEIGHT: 140 LBS

## 2025-02-10 DIAGNOSIS — M10.9 ACUTE GOUT INVOLVING TOE OF RIGHT FOOT, UNSPECIFIED CAUSE: ICD-10-CM

## 2025-02-10 DIAGNOSIS — M76.31 IT BAND SYNDROME, RIGHT: Primary | ICD-10-CM

## 2025-02-10 PROCEDURE — 99203 OFFICE O/P NEW LOW 30 MIN: CPT

## 2025-02-10 PROCEDURE — 1126F AMNT PAIN NOTED NONE PRSNT: CPT

## 2025-02-10 PROCEDURE — 1159F MED LIST DOCD IN RCRD: CPT

## 2025-02-10 PROCEDURE — 1123F ACP DISCUSS/DSCN MKR DOCD: CPT

## 2025-02-10 ASSESSMENT — ENCOUNTER SYMPTOMS: COLOR CHANGE: 1

## 2025-02-10 NOTE — PROGRESS NOTES
[facility]ICIANS [facility] ORTHOPEDICS AND SPORTS MEDICINE  [address]  [address]  [place] [id number]  Dept: [phone]    Ambulatory Orthopedic New Patient Visit      CHIEF COMPLAINT:    Chief Complaint   Patient presents with    New Patient     R Thigh Pain       HISTORY OF PRESENT ILLNESS:      History of Present Illness  The patient is a 74-year-old female who presents for evaluation of right thigh pain and acute onset of gout.    She was diagnosed with gout in her big toe during an emergency room visit on Thursday. This was her first episode of gout. She has been reading about the risk factors for gout and reports no consumption of beer or wearing high heels. She maintains good hydration and avoids soda. She experienced a period of bed rest due to influenza A last weekend, during which she consumed Seven Up for 2 days and then switched to water. She began to feel better a week ago but experienced exhaustion after resuming activities. By Monday evening, she felt as though she had stubbed her big toe, although she does not recall doing so. On Tuesday morning, she noticed inflammation, redness, and pain upon touch in her big toe. She sought care at an urgent care facility on Wednesday, where blood work was performed. The blood work did not indicate gout. Due to persistent symptoms and lack of relief from Tylenol, she presented to the ER on Thursday. The ER physician explained that uric acid concentrates in the joint during a gout flare, which may not be reflected in blood tests. After reviewing the x-ray and examining her toe, the physician diagnosed her with gout and prescribed prednisone. She has one more day of prednisone left. She reports significant improvement in her symptoms, with no pain but some residual swelling and tenderness. She has a family history of gout in her father, who experienced it in his knees. She is curious if her recent

## 2025-02-11 ENCOUNTER — HOSPITAL ENCOUNTER (OUTPATIENT)
Dept: ULTRASOUND IMAGING | Age: 75
Discharge: HOME | End: 2025-02-13
Payer: MEDICARE

## 2025-02-11 DIAGNOSIS — R74.8 ELEVATED LIVER ENZYMES: ICD-10-CM

## 2025-02-11 PROCEDURE — 76705 ECHO EXAM OF ABDOMEN: CPT

## 2025-02-18 ENCOUNTER — OFFICE VISIT (OUTPATIENT)
Dept: FAMILY MEDICINE CLINIC | Age: 75
End: 2025-02-18

## 2025-02-18 VITALS
WEIGHT: 140.5 LBS | DIASTOLIC BLOOD PRESSURE: 84 MMHG | SYSTOLIC BLOOD PRESSURE: 127 MMHG | OXYGEN SATURATION: 98 % | BODY MASS INDEX: 27.58 KG/M2 | TEMPERATURE: 97.9 F | HEIGHT: 60 IN | HEART RATE: 69 BPM

## 2025-02-18 DIAGNOSIS — B96.89 ACUTE BACTERIAL SINUSITIS: Primary | ICD-10-CM

## 2025-02-18 DIAGNOSIS — J02.9 SORE THROAT: ICD-10-CM

## 2025-02-18 DIAGNOSIS — J01.90 ACUTE BACTERIAL SINUSITIS: Primary | ICD-10-CM

## 2025-02-18 LAB
STREP PYOGENES DNA, POC: NEGATIVE
VALID INTERNAL CONTROL, POC: NORMAL

## 2025-02-18 SDOH — ECONOMIC STABILITY: FOOD INSECURITY: WITHIN THE PAST 12 MONTHS, THE FOOD YOU BOUGHT JUST DIDN'T LAST AND YOU DIDN'T HAVE MONEY TO GET MORE.: NEVER TRUE

## 2025-02-18 SDOH — ECONOMIC STABILITY: FOOD INSECURITY: WITHIN THE PAST 12 MONTHS, YOU WORRIED THAT YOUR FOOD WOULD RUN OUT BEFORE YOU GOT MONEY TO BUY MORE.: NEVER TRUE

## 2025-02-18 ASSESSMENT — PATIENT HEALTH QUESTIONNAIRE - PHQ9
SUM OF ALL RESPONSES TO PHQ QUESTIONS 1-9: 0
SUM OF ALL RESPONSES TO PHQ QUESTIONS 1-9: 0
2. FEELING DOWN, DEPRESSED OR HOPELESS: NOT AT ALL
SUM OF ALL RESPONSES TO PHQ QUESTIONS 1-9: 0
1. LITTLE INTEREST OR PLEASURE IN DOING THINGS: NOT AT ALL
SUM OF ALL RESPONSES TO PHQ QUESTIONS 1-9: 0
SUM OF ALL RESPONSES TO PHQ9 QUESTIONS 1 & 2: 0

## 2025-02-18 NOTE — PROGRESS NOTES
[facility] PHYSICIANS [facility], [facility] WALK-IN  [address]  [address]  [facility] [address]  Dept: [phone]  Dept Fax: [phone]    Mei Vizcarra is a 74 y.o. female who presents today for her medical conditions/complaints of   Chief Complaint   Patient presents with    Congestion     Thinks sinus infection  2 weeks ago positive for flu a   Then dx with gout .    Cough     2/14/2025 started   Sore throat          HPI:     /84   Pulse 69   Temp 97.9 °F (36.6 °C)   Ht 1.524 m (5')   Wt 63.7 kg (140 lb 8 oz)   SpO2 98%   BMI 27.44 kg/m²       HPI  Pt presented to the clinic today with c/o congestion. This is a new problem. The current episode started 5 days ago. Associated symptoms include: sore throat, post nasal drip, cough, sinus pain/pressure.  Recently had Flu A and gout.  Pertinent negatives include: No fever, chills, SOB, chest pain .  Pt has tried nasal spray with little improvement.       Past Medical History:   Diagnosis Date    Chronic kidney disease, stage 2, mildly decreased GFR     Frequency of urination     Hot flashes     Hyperlipidemia     Hypertension     Incontinence of urine     Urinary retention     UTI (urinary tract infection)         Past Surgical History:   Procedure Laterality Date    APPENDECTOMY      CATARACT REMOVAL Right     CHOLECYSTECTOMY      HYSTERECTOMY, VAGINAL  10/2022    TONSILLECTOMY  1955       Family History   Problem Relation Age of Onset    Heart Disease Mother     High Cholesterol Mother     Kidney Disease Father     Breast Cancer Neg Hx        Social History     Tobacco Use    Smoking status: Never    Smokeless tobacco: Never   Substance Use Topics    Alcohol use: Never        Prior to Visit Medications    Medication Sig Taking? Authorizing Provider   amoxicillin-clavulanate (AUGMENTIN) 875-125 MG per tablet Take 1 tablet by mouth 2 times daily for 10 days Yes Carmela Hurley, APRN - CNP   rosuvastatin

## 2025-02-20 ENCOUNTER — OFFICE VISIT (OUTPATIENT)
Dept: FAMILY MEDICINE CLINIC | Age: 75
End: 2025-02-20

## 2025-02-20 VITALS
DIASTOLIC BLOOD PRESSURE: 80 MMHG | OXYGEN SATURATION: 97 % | BODY MASS INDEX: 27.5 KG/M2 | HEART RATE: 99 BPM | RESPIRATION RATE: 16 BRPM | WEIGHT: 140.8 LBS | TEMPERATURE: 97.8 F | SYSTOLIC BLOOD PRESSURE: 126 MMHG

## 2025-02-20 DIAGNOSIS — B96.89 ACUTE BACTERIAL SINUSITIS: Primary | ICD-10-CM

## 2025-02-20 DIAGNOSIS — J01.90 ACUTE BACTERIAL SINUSITIS: Primary | ICD-10-CM

## 2025-02-20 RX ORDER — PREDNISONE 20 MG/1
20 TABLET ORAL DAILY
Qty: 5 TABLET | Refills: 0 | Status: SHIPPED | OUTPATIENT
Start: 2025-02-20 | End: 2025-02-25

## 2025-02-20 NOTE — PROGRESS NOTES
[facility] PHYSICIANS [facility], [facility] WALK-IN  [address]  [address]  [facility] [address]  Dept: [phone]     Mei Vizcarra is a 74 y.o. female Established patient, who presents to the walk-in clinic today with conditions/complaints as noted below:    Chief Complaint   Patient presents with    Congestion     Seen in office 2/18/25. Symptoms worsening.      Cough     Wheezing. X7 days. Sore throat          HPI:     HPI  Pt presented to the clinic today with c/o congestion. This is a new problem. The current episode started 7 days ago. Associated symptoms include: cough, sore throat, wheezing, PND,  runny nose, sinus pain.  Pertinent negatives include: No fever, chills, aches,  SOB.  Pt has tried Augmentin (2 days) with no improvement.      Was seen 2 days ago in office. Dx with sinusitis and given augmentin. Was negative for strep. States she is not getting better.   Had the flu 2 weeks ago - recovered then got sick again.    Past Medical History:   Diagnosis Date    Chronic kidney disease, stage 2, mildly decreased GFR     Frequency of urination     Hot flashes     Hyperlipidemia     Hypertension     Incontinence of urine     Urinary retention     UTI (urinary tract infection)        Current Outpatient Medications   Medication Sig Dispense Refill    predniSONE (DELTASONE) 20 MG tablet Take 1 tablet by mouth daily for 5 days 5 tablet 0    amoxicillin-clavulanate (AUGMENTIN) 875-125 MG per tablet Take 1 tablet by mouth 2 times daily for 10 days 20 tablet 0    rosuvastatin (CRESTOR) 40 MG tablet TAKE ONE TABLET BY MOUTH EVERY EVENING 90 tablet 1    amLODIPine (NORVASC) 2.5 MG tablet Take 1 tablet by mouth daily 90 tablet 1    olmesartan (BENICAR) 40 MG tablet Take 1 tablet by mouth daily 30 tablet 5    levocetirizine (XYZAL) 5 MG tablet Take 1 tablet by mouth nightly 30 tablet 0    loratadine (CLARITIN) 10 MG capsule Take 1 capsule by mouth daily

## 2025-02-27 ENCOUNTER — HOSPITAL ENCOUNTER (OUTPATIENT)
Dept: GENERAL RADIOLOGY | Age: 75
Discharge: HOME | End: 2025-03-01
Payer: MEDICARE

## 2025-02-27 ENCOUNTER — OFFICE VISIT (OUTPATIENT)
Dept: PRIMARY CARE CLINIC | Age: 75
End: 2025-02-27

## 2025-02-27 ENCOUNTER — HOSPITAL ENCOUNTER (OUTPATIENT)
Age: 75
Discharge: HOME | End: 2025-03-01
Payer: MEDICARE

## 2025-02-27 VITALS
OXYGEN SATURATION: 97 % | SYSTOLIC BLOOD PRESSURE: 118 MMHG | WEIGHT: 140 LBS | DIASTOLIC BLOOD PRESSURE: 68 MMHG | BODY MASS INDEX: 27.34 KG/M2 | HEART RATE: 63 BPM

## 2025-02-27 DIAGNOSIS — R05.9 COUGH, UNSPECIFIED TYPE: ICD-10-CM

## 2025-02-27 DIAGNOSIS — R05.9 COUGH, UNSPECIFIED TYPE: Primary | ICD-10-CM

## 2025-02-27 PROCEDURE — 71046 X-RAY EXAM CHEST 2 VIEWS: CPT

## 2025-02-27 RX ORDER — BENZONATATE 100 MG/1
100 CAPSULE ORAL 3 TIMES DAILY PRN
Qty: 30 CAPSULE | Refills: 1 | Status: SHIPPED | OUTPATIENT
Start: 2025-02-27

## 2025-02-27 RX ORDER — AZITHROMYCIN 250 MG/1
TABLET, FILM COATED ORAL
Qty: 6 TABLET | Refills: 0 | Status: SHIPPED | OUTPATIENT
Start: 2025-02-27 | End: 2025-03-09

## 2025-02-27 ASSESSMENT — ENCOUNTER SYMPTOMS
VOMITING: 0
COUGH: 1

## 2025-02-27 NOTE — PROGRESS NOTES
facility-administered medications for this visit.     Allergies   Allergen Reactions    Josh Aspirin [Aspirin]     Doxycycline Hyclate        Health Maintenance   Topic Date Due    Diabetic retinal exam  Never done    DTaP/Tdap/Td vaccine (1 - Tdap) Never done    Flu vaccine (1) 08/01/2024    COVID-19 Vaccine (7 - 2024-25 season) 09/01/2024    Colorectal Cancer Screen  12/03/2024    Annual Wellness Visit (Medicare Advantage)  01/01/2025    Diabetic foot exam  04/24/2025    Lipids  04/24/2025    A1C test (Diabetic or Prediabetic)  11/07/2025    GFR test (Diabetes, CKD 3-4, OR last GFR 15-59)  11/21/2025    Diabetic Alb to Cr ratio (uACR) test  01/23/2026    Depression Screen  02/18/2026    Breast cancer screen  06/04/2026    Respiratory Syncytial Virus (RSV) Pregnant or age 60 yrs+ (1 - 1-dose 75+ series) 12/02/2026    DEXA (modify frequency per FRAX score)  Completed    Shingles vaccine  Completed    Pneumococcal 50+ years Vaccine  Completed    Hepatitis C screen  Completed    Hepatitis A vaccine  Aged Out    Hepatitis B vaccine  Aged Out    Hib vaccine  Aged Out    Polio vaccine  Aged Out    Meningococcal (ACWY) vaccine  Aged Out    Diabetes screen  Discontinued       Subjective:      Review of Systems   Constitutional:  Negative for chills and fever.   HENT:  Positive for congestion.    Respiratory:  Positive for cough.    Gastrointestinal:  Negative for vomiting.       Objective:   /68   Pulse 63   Wt 63.5 kg (140 lb)   SpO2 97%   BMI 27.34 kg/m²     Physical Exam      Physical Exam  Constitutional:       Appearance: She is well-developed.   HENT:      Head: Normocephalic and atraumatic.      Right Ear: External ear normal.      Left Ear: External ear normal.   Eyes:      Conjunctiva/sclera: Conjunctivae normal.      Pupils: Pupils are equal, round, and reactive to light.   Cardiovascular:      Rate and Rhythm: Normal rate and regular rhythm.      Heart sounds: Normal heart sounds.   Pulmonary:

## 2025-04-05 SDOH — HEALTH STABILITY: PHYSICAL HEALTH: ON AVERAGE, HOW MANY MINUTES DO YOU ENGAGE IN EXERCISE AT THIS LEVEL?: 30 MIN

## 2025-04-05 SDOH — HEALTH STABILITY: PHYSICAL HEALTH: ON AVERAGE, HOW MANY DAYS PER WEEK DO YOU ENGAGE IN MODERATE TO STRENUOUS EXERCISE (LIKE A BRISK WALK)?: 2 DAYS

## 2025-04-05 ASSESSMENT — LIFESTYLE VARIABLES
HOW OFTEN DO YOU HAVE A DRINK CONTAINING ALCOHOL: NEVER
HOW MANY STANDARD DRINKS CONTAINING ALCOHOL DO YOU HAVE ON A TYPICAL DAY: PATIENT DOES NOT DRINK

## 2025-04-05 ASSESSMENT — PATIENT HEALTH QUESTIONNAIRE - PHQ9
SUM OF ALL RESPONSES TO PHQ QUESTIONS 1-9: 0
SUM OF ALL RESPONSES TO PHQ QUESTIONS 1-9: 0
2. FEELING DOWN, DEPRESSED OR HOPELESS: NOT AT ALL
SUM OF ALL RESPONSES TO PHQ QUESTIONS 1-9: 0
1. LITTLE INTEREST OR PLEASURE IN DOING THINGS: NOT AT ALL
SUM OF ALL RESPONSES TO PHQ QUESTIONS 1-9: 0

## 2025-04-15 ENCOUNTER — TELEPHONE (OUTPATIENT)
Dept: PRIMARY CARE CLINIC | Age: 75
End: 2025-04-15

## 2025-04-15 NOTE — TELEPHONE ENCOUNTER
Patient called asking for an appointment next week for high /80 x 2 weeks. You have no available appointment until middle of May.     Please advise     Last Visit Date: 2/27/2025   Next Visit Date: 5/21/2025

## 2025-04-15 NOTE — TELEPHONE ENCOUNTER
Pt had asked for an appointment for next week, pt states she is too busy this week.    Writer scheduled pt for next Thursday at 10am

## 2025-04-24 ENCOUNTER — OFFICE VISIT (OUTPATIENT)
Dept: PRIMARY CARE CLINIC | Age: 75
End: 2025-04-24
Payer: MEDICARE

## 2025-04-24 VITALS
HEIGHT: 60 IN | HEART RATE: 66 BPM | OXYGEN SATURATION: 97 % | DIASTOLIC BLOOD PRESSURE: 62 MMHG | BODY MASS INDEX: 28.16 KG/M2 | WEIGHT: 143.4 LBS | SYSTOLIC BLOOD PRESSURE: 132 MMHG

## 2025-04-24 DIAGNOSIS — E11.22 TYPE 2 DIABETES MELLITUS WITH CHRONIC KIDNEY DISEASE, WITHOUT LONG-TERM CURRENT USE OF INSULIN, UNSPECIFIED CKD STAGE (HCC): Primary | ICD-10-CM

## 2025-04-24 DIAGNOSIS — G47.00 INSOMNIA, UNSPECIFIED TYPE: ICD-10-CM

## 2025-04-24 DIAGNOSIS — I10 PRIMARY HYPERTENSION: ICD-10-CM

## 2025-04-24 LAB — HBA1C MFR BLD: 7.3 %

## 2025-04-24 PROCEDURE — 1159F MED LIST DOCD IN RCRD: CPT | Performed by: FAMILY MEDICINE

## 2025-04-24 PROCEDURE — 1123F ACP DISCUSS/DSCN MKR DOCD: CPT | Performed by: FAMILY MEDICINE

## 2025-04-24 PROCEDURE — 3051F HG A1C>EQUAL 7.0%<8.0%: CPT | Performed by: FAMILY MEDICINE

## 2025-04-24 PROCEDURE — 99214 OFFICE O/P EST MOD 30 MIN: CPT | Performed by: FAMILY MEDICINE

## 2025-04-24 PROCEDURE — 83036 HEMOGLOBIN GLYCOSYLATED A1C: CPT | Performed by: FAMILY MEDICINE

## 2025-04-24 PROCEDURE — 3078F DIAST BP <80 MM HG: CPT | Performed by: FAMILY MEDICINE

## 2025-04-24 PROCEDURE — 3075F SYST BP GE 130 - 139MM HG: CPT | Performed by: FAMILY MEDICINE

## 2025-04-24 RX ORDER — AMLODIPINE BESYLATE 2.5 MG/1
5 TABLET ORAL DAILY
Qty: 90 TABLET | Refills: 1 | Status: SHIPPED | OUTPATIENT
Start: 2025-04-24 | End: 2025-04-24

## 2025-04-24 RX ORDER — AVOBENZONE, HOMOSALATE, OCTISALATE, OCTOCRYLENE 30; 40; 45; 26 MG/ML; MG/ML; MG/ML; MG/ML
1 CREAM TOPICAL DAILY
Qty: 100 EACH | Refills: 5 | Status: SHIPPED | OUTPATIENT
Start: 2025-04-24

## 2025-04-24 RX ORDER — GLUCOSAMINE HCL/CHONDROITIN SU 500-400 MG
CAPSULE ORAL
Qty: 120 STRIP | Refills: 3 | Status: SHIPPED | OUTPATIENT
Start: 2025-04-24

## 2025-04-24 RX ORDER — BLOOD-GLUCOSE METER
1 KIT MISCELLANEOUS DAILY
Qty: 1 KIT | Refills: 0 | Status: SHIPPED | OUTPATIENT
Start: 2025-04-24

## 2025-04-24 RX ORDER — AMLODIPINE BESYLATE 5 MG/1
5 TABLET ORAL DAILY
Qty: 90 TABLET | Refills: 1 | Status: SHIPPED | OUTPATIENT
Start: 2025-04-24

## 2025-04-24 SDOH — ECONOMIC STABILITY: FOOD INSECURITY: WITHIN THE PAST 12 MONTHS, THE FOOD YOU BOUGHT JUST DIDN'T LAST AND YOU DIDN'T HAVE MONEY TO GET MORE.: NEVER TRUE

## 2025-04-24 SDOH — ECONOMIC STABILITY: FOOD INSECURITY: WITHIN THE PAST 12 MONTHS, YOU WORRIED THAT YOUR FOOD WOULD RUN OUT BEFORE YOU GOT MONEY TO BUY MORE.: NEVER TRUE

## 2025-04-24 ASSESSMENT — PATIENT HEALTH QUESTIONNAIRE - PHQ9
SUM OF ALL RESPONSES TO PHQ QUESTIONS 1-9: 0
SUM OF ALL RESPONSES TO PHQ QUESTIONS 1-9: 0
1. LITTLE INTEREST OR PLEASURE IN DOING THINGS: NOT AT ALL
SUM OF ALL RESPONSES TO PHQ QUESTIONS 1-9: 0
2. FEELING DOWN, DEPRESSED OR HOPELESS: NOT AT ALL
SUM OF ALL RESPONSES TO PHQ QUESTIONS 1-9: 0

## 2025-04-24 ASSESSMENT — ENCOUNTER SYMPTOMS
SHORTNESS OF BREATH: 0
VOMITING: 0

## 2025-04-24 NOTE — PROGRESS NOTES
[facility]  [facility] PRIMARY CARE  [address] [place] [address]  [address]  [facility] [address]  Dept: [phone]  Dept Fax: [phone]    Mei Vizcarra is a 74 y.o. female who presentstoday for her medical conditions/complaints as noted below.  Mei Vizcarra is c/o of  Chief Complaint   Patient presents with    Diabetes    Hypertension     Hgh Bps lately, for a few weeks.         HPI:     History of Present Illness  The patient presents for evaluation of hypertension, diabetes mellitus, and sleep disturbance.    Blood pressure readings have been elevated with a recent reading of 152/62 upon arrival at the clinic. She is currently on a regimen of amlodipine 2.5 mg and olmesartan 40 mg.has readings in the 150s to 160 systolic from home.    A  increase in A1c levels to 7.3 was noted. She has expressed a desire to avoid medication for diabetes management in the past but is open to start medication now. No recent changes in diet, specifically no increased consumption of sweets over the weekend, were reported.    Difficulty falling asleep is reported, often taking an extended period before achieving sleep onset. Frequent awakenings at night, up to 2 or 3 times, necessitating bathroom visits, are experienced. Returning to sleep after these interruptions is challenging, sometimes remaining awake for up to 3 hours. Melatonin has been used to manage sleep disturbances, which she reports as beneficial, but there is reluctance to use it nightly.       Hemoglobin A1C (%)   Date Value   04/24/2025 7.3   11/07/2024 6.9   04/24/2024 6.8             ( goal A1C is < 7)   No components found for: \"LABMICR\"  No components found for: \"LDLCHOLESTEROL\", \"LDLCALC\"    (goal LDL is <100)   AST (U/L)   Date Value   01/23/2025 44 (H)     ALT (U/L)   Date Value   01/23/2025 54 (H)     BUN (mg/dL)   Date Value   11/21/2024 25 (H)     BP Readings from Last 3 Encounters:   04/24/25 132/62   02/27/25 118/68   02/20/25 126/80

## 2025-04-28 ENCOUNTER — TELEPHONE (OUTPATIENT)
Dept: PRIMARY CARE CLINIC | Age: 75
End: 2025-04-28

## 2025-04-28 DIAGNOSIS — E11.22 TYPE 2 DIABETES MELLITUS WITH CHRONIC KIDNEY DISEASE, WITHOUT LONG-TERM CURRENT USE OF INSULIN, UNSPECIFIED CKD STAGE (HCC): ICD-10-CM

## 2025-04-28 RX ORDER — BLOOD-GLUCOSE METER
1 KIT MISCELLANEOUS DAILY
Qty: 1 KIT | Refills: 0 | Status: SHIPPED | OUTPATIENT
Start: 2025-04-28

## 2025-04-28 NOTE — TELEPHONE ENCOUNTER
Lauren at [facility] [place] called in asking if PCP can send in an order for a Blood Glucose Testing Meter. She states that they received prescription for the strips, but patient does not have a meter.

## 2025-06-02 DIAGNOSIS — I10 PRIMARY HYPERTENSION: ICD-10-CM

## 2025-06-02 RX ORDER — OLMESARTAN MEDOXOMIL 40 MG/1
40 TABLET ORAL DAILY
Qty: 30 TABLET | Refills: 5 | Status: SHIPPED | OUTPATIENT
Start: 2025-06-02

## 2025-06-02 NOTE — TELEPHONE ENCOUNTER
Last OV:  4/24/2025    Next OV: 7/24/2025    Pharmacy:   [facility] [id number] - [place], [place] - [address] - P [phone] - F [phone]  [address] [facility] [address]  Phone: [phone] Fax: [phone]       Confirmed? Yes

## 2025-06-10 ENCOUNTER — HOSPITAL ENCOUNTER (OUTPATIENT)
Dept: MAMMOGRAPHY | Age: 75
Discharge: HOME | End: 2025-06-12
Payer: MEDICARE

## 2025-06-10 VITALS — WEIGHT: 143 LBS | BODY MASS INDEX: 27.93 KG/M2

## 2025-06-10 DIAGNOSIS — Z12.31 OTHER SCREENING MAMMOGRAM: ICD-10-CM

## 2025-06-10 PROCEDURE — 77063 BREAST TOMOSYNTHESIS BI: CPT

## 2025-06-11 ENCOUNTER — RESULTS FOLLOW-UP (OUTPATIENT)
Dept: PRIMARY CARE CLINIC | Age: 75
End: 2025-06-11

## 2025-06-30 RX ORDER — ROSUVASTATIN CALCIUM 40 MG/1
40 TABLET, COATED ORAL NIGHTLY
Qty: 90 TABLET | Refills: 1 | Status: SHIPPED | OUTPATIENT
Start: 2025-06-30

## 2025-07-21 SDOH — HEALTH STABILITY: PHYSICAL HEALTH: ON AVERAGE, HOW MANY MINUTES DO YOU ENGAGE IN EXERCISE AT THIS LEVEL?: 30 MIN

## 2025-07-21 SDOH — HEALTH STABILITY: PHYSICAL HEALTH: ON AVERAGE, HOW MANY DAYS PER WEEK DO YOU ENGAGE IN MODERATE TO STRENUOUS EXERCISE (LIKE A BRISK WALK)?: 3 DAYS

## 2025-07-21 ASSESSMENT — PATIENT HEALTH QUESTIONNAIRE - PHQ9
1. LITTLE INTEREST OR PLEASURE IN DOING THINGS: NOT AT ALL
SUM OF ALL RESPONSES TO PHQ QUESTIONS 1-9: 0
2. FEELING DOWN, DEPRESSED OR HOPELESS: NOT AT ALL
SUM OF ALL RESPONSES TO PHQ QUESTIONS 1-9: 0

## 2025-07-21 ASSESSMENT — LIFESTYLE VARIABLES
HOW OFTEN DO YOU HAVE A DRINK CONTAINING ALCOHOL: NEVER
HOW MANY STANDARD DRINKS CONTAINING ALCOHOL DO YOU HAVE ON A TYPICAL DAY: 0
HOW OFTEN DO YOU HAVE A DRINK CONTAINING ALCOHOL: 1
HOW OFTEN DO YOU HAVE SIX OR MORE DRINKS ON ONE OCCASION: 1
HOW MANY STANDARD DRINKS CONTAINING ALCOHOL DO YOU HAVE ON A TYPICAL DAY: PATIENT DOES NOT DRINK

## 2025-07-24 ENCOUNTER — OFFICE VISIT (OUTPATIENT)
Dept: PRIMARY CARE CLINIC | Age: 75
End: 2025-07-24

## 2025-07-24 ENCOUNTER — HOSPITAL ENCOUNTER (OUTPATIENT)
Age: 75
Setting detail: SPECIMEN
Discharge: HOME | End: 2025-07-24

## 2025-07-24 VITALS
WEIGHT: 142.6 LBS | OXYGEN SATURATION: 99 % | SYSTOLIC BLOOD PRESSURE: 132 MMHG | BODY MASS INDEX: 28 KG/M2 | DIASTOLIC BLOOD PRESSURE: 80 MMHG | HEIGHT: 60 IN | HEART RATE: 66 BPM

## 2025-07-24 DIAGNOSIS — E11.22 TYPE 2 DIABETES MELLITUS WITH CHRONIC KIDNEY DISEASE, WITHOUT LONG-TERM CURRENT USE OF INSULIN, UNSPECIFIED CKD STAGE (HCC): ICD-10-CM

## 2025-07-24 DIAGNOSIS — R53.83 OTHER FATIGUE: ICD-10-CM

## 2025-07-24 DIAGNOSIS — E55.9 VITAMIN D DEFICIENCY: ICD-10-CM

## 2025-07-24 DIAGNOSIS — M25.50 ARTHRALGIA, UNSPECIFIED JOINT: ICD-10-CM

## 2025-07-24 DIAGNOSIS — Z00.00 MEDICARE ANNUAL WELLNESS VISIT, SUBSEQUENT: Primary | ICD-10-CM

## 2025-07-24 DIAGNOSIS — M79.10 MYALGIA: ICD-10-CM

## 2025-07-24 LAB
25(OH)D3 SERPL-MCNC: 37 NG/ML (ref 30–100)
ALBUMIN SERPL-MCNC: 4.5 G/DL (ref 3.5–5.2)
ALBUMIN/GLOB SERPL: 1.5 {RATIO} (ref 1–2.5)
ALP SERPL-CCNC: 60 U/L (ref 35–104)
ALT SERPL-CCNC: 68 U/L (ref 10–35)
ANION GAP SERPL CALCULATED.3IONS-SCNC: 12 MMOL/L (ref 9–16)
AST SERPL-CCNC: 47 U/L (ref 10–35)
BASOPHILS # BLD: 0.04 K/UL (ref 0–0.2)
BASOPHILS NFR BLD: 1 % (ref 0–2)
BILIRUB SERPL-MCNC: 0.8 MG/DL (ref 0–1.2)
BUN SERPL-MCNC: 18 MG/DL (ref 8–23)
CALCIUM SERPL-MCNC: 10.2 MG/DL (ref 8.6–10.4)
CHLORIDE SERPL-SCNC: 105 MMOL/L (ref 98–107)
CHOLEST SERPL-MCNC: 142 MG/DL (ref 0–199)
CHOLESTEROL/HDL RATIO: 2.6
CK SERPL-CCNC: 50 U/L (ref 26–192)
CO2 SERPL-SCNC: 22 MMOL/L (ref 20–31)
CREAT SERPL-MCNC: 0.9 MG/DL (ref 0.6–0.9)
CRP SERPL HS-MCNC: <3 MG/L (ref 0–5)
EOSINOPHIL # BLD: 0.18 K/UL (ref 0–0.44)
EOSINOPHILS RELATIVE PERCENT: 2 % (ref 1–4)
ERYTHROCYTE [DISTWIDTH] IN BLOOD BY AUTOMATED COUNT: 13.6 % (ref 11.8–14.4)
ERYTHROCYTE [SEDIMENTATION RATE] IN BLOOD BY PHOTOMETRIC METHOD: 16 MM/HR (ref 0–30)
GFR, ESTIMATED: 68 ML/MIN/1.73M2
GLUCOSE SERPL-MCNC: 100 MG/DL (ref 74–99)
HBA1C MFR BLD: 7.4 %
HCT VFR BLD AUTO: 45.2 % (ref 36.3–47.1)
HDLC SERPL-MCNC: 55 MG/DL
HGB BLD-MCNC: 14.4 G/DL (ref 11.9–15.1)
IMM GRANULOCYTES # BLD AUTO: 0.03 K/UL (ref 0–0.3)
IMM GRANULOCYTES NFR BLD: 0 %
LDLC SERPL CALC-MCNC: 52 MG/DL (ref 0–100)
LYMPHOCYTES NFR BLD: 2.34 K/UL (ref 1.1–3.7)
LYMPHOCYTES RELATIVE PERCENT: 29 % (ref 24–43)
MCH RBC QN AUTO: 29.5 PG (ref 25.2–33.5)
MCHC RBC AUTO-ENTMCNC: 31.9 G/DL (ref 28.4–34.8)
MCV RBC AUTO: 92.6 FL (ref 82.6–102.9)
MONOCYTES NFR BLD: 0.75 K/UL (ref 0.1–1.2)
MONOCYTES NFR BLD: 9 % (ref 3–12)
NEUTROPHILS NFR BLD: 59 % (ref 36–65)
NEUTS SEG NFR BLD: 4.65 K/UL (ref 1.5–8.1)
NRBC BLD-RTO: 0 PER 100 WBC
PLATELET # BLD AUTO: 502 K/UL (ref 138–453)
PMV BLD AUTO: 10.7 FL (ref 8.1–13.5)
POTASSIUM SERPL-SCNC: 4.9 MMOL/L (ref 3.7–5.3)
PROT SERPL-MCNC: 7.5 G/DL (ref 6.6–8.7)
RBC # BLD AUTO: 4.88 M/UL (ref 3.95–5.11)
RHEUMATOID FACT SER NEPH-ACNC: <10 IU/ML (ref 0–13)
SODIUM SERPL-SCNC: 139 MMOL/L (ref 136–145)
T4 FREE SERPL-MCNC: 1 NG/DL (ref 0.92–1.68)
TRIGL SERPL-MCNC: 176 MG/DL
TSH SERPL DL<=0.05 MIU/L-ACNC: 4.3 UIU/ML (ref 0.27–4.2)
VLDLC SERPL CALC-MCNC: 35 MG/DL (ref 1–30)
WBC OTHER # BLD: 8 K/UL (ref 3.5–11.3)

## 2025-07-24 NOTE — PROGRESS NOTES
Medicare Annual Wellness Visit    Mei Vizcarra is here for Medicare AWV, Diabetes (3mo f/u, was placed on Januvia at the end of April but pt become fatigued & had muscle aches, dc'd the medication but her sx continued and have not stopped, pt has days (like today) where she wakes up and feels like she has the flu), Swelling (Has had a few episodes where her left foot has become swollen in the evening), and Health Maintenance (CRC)    Assessment & Plan  1. Muscle aches.  - Reports muscle aches and fatigue that began after starting Januvia and persisted even after discontinuing it at the end of May.  - Aches occur daily and are somewhat alleviated by Tylenol.  - Blood work will be ordered to check inflammatory markers and cholesterol levels.  - Advised to stop taking the statin for a week to see if symptoms improve. If the pain subsides, switching to a different statin may be considered.    2. Diabetes mellitus.  - A1c level is currently 7.4, slightly up from 7.3 previously.  - Prefers to manage diabetes without additional medication at this time.  - Advised to increase physical activity, such as walking and outdoor activities, to help control blood sugar levels.  - If A1c continues to rise, further intervention may be necessary.    3. Left foot swelling.  - Reports intermittent swelling in the left foot, managed by elevating the foot above heart level.  - No swelling in the whole leg, only the foot.  - Kidney function tests will be ordered to ensure stability, as she has stage 3 kidney disease.  - Swelling noted twice in the past three weeks.    Follow-up: A follow-up visit is scheduled in 3 months.    Type 2 diabetes mellitus with chronic kidney disease, without long-term current use of insulin, unspecified CKD stage (HCC)  -     POCT glycosylated hemoglobin (Hb A1C)  -     CBC with Auto Differential; Future  -     Comprehensive Metabolic Panel; Future  -     Lipid Panel; Future  Medicare annual wellness visit,

## 2025-08-07 ENCOUNTER — TELEPHONE (OUTPATIENT)
Dept: PRIMARY CARE CLINIC | Age: 75
End: 2025-08-07

## 2025-08-21 ENCOUNTER — HOSPITAL ENCOUNTER (OUTPATIENT)
Age: 75
Discharge: HOME | End: 2025-08-23
Payer: MEDICARE

## 2025-08-21 VITALS — BODY MASS INDEX: 27.88 KG/M2 | HEIGHT: 60 IN | WEIGHT: 142 LBS

## 2025-08-21 DIAGNOSIS — R60.0 BILATERAL LOWER EXTREMITY EDEMA: ICD-10-CM

## 2025-08-21 LAB
ECHO AO ASC DIAM: 3.3 CM
ECHO AO ASCENDING AORTA INDEX: 2.05 CM/M2
ECHO AO ROOT DIAM: 2.6 CM
ECHO AO ROOT INDEX: 1.61 CM/M2
ECHO AV AREA PEAK VELOCITY: 2.2 CM2
ECHO AV AREA VTI: 2.2 CM2
ECHO AV AREA/BSA PEAK VELOCITY: 1.4 CM2/M2
ECHO AV AREA/BSA VTI: 1.4 CM2/M2
ECHO AV MEAN GRADIENT: 4 MMHG
ECHO AV MEAN VELOCITY: 0.9 M/S
ECHO AV PEAK GRADIENT: 7 MMHG
ECHO AV PEAK VELOCITY: 1.3 M/S
ECHO AV VELOCITY RATIO: 0.69
ECHO AV VTI: 32.4 CM
ECHO BSA: 1.65 M2
ECHO EST RA PRESSURE: 3 MMHG
ECHO IVC EXP: 1.6 CM
ECHO IVC INSP: 0.4 CM
ECHO LA AREA 2C: 13.3 CM2
ECHO LA AREA 4C: 9.1 CM2
ECHO LA DIAMETER INDEX: 2.11 CM/M2
ECHO LA DIAMETER: 3.4 CM
ECHO LA MAJOR AXIS: 3.5 CM
ECHO LA MINOR AXIS: 4.2 CM
ECHO LA TO AORTIC ROOT RATIO: 1.31
ECHO LA VOL BP: 27 ML (ref 22–52)
ECHO LA VOL MOD A2C: 35 ML (ref 22–52)
ECHO LA VOL MOD A4C: 17 ML (ref 22–52)
ECHO LA VOL/BSA BIPLANE: 17 ML/M2 (ref 16–34)
ECHO LA VOLUME INDEX MOD A2C: 22 ML/M2 (ref 16–34)
ECHO LA VOLUME INDEX MOD A4C: 11 ML/M2 (ref 16–34)
ECHO LV E' LATERAL VELOCITY: 6.42 CM/S
ECHO LV E' SEPTAL VELOCITY: 4.68 CM/S
ECHO LV EF PHYSICIAN: 65 %
ECHO LV FRACTIONAL SHORTENING: 31 % (ref 28–44)
ECHO LV INTERNAL DIMENSION DIASTOLE INDEX: 2.24 CM/M2
ECHO LV INTERNAL DIMENSION DIASTOLIC: 3.6 CM (ref 3.9–5.3)
ECHO LV INTERNAL DIMENSION SYSTOLIC INDEX: 1.55 CM/M2
ECHO LV INTERNAL DIMENSION SYSTOLIC: 2.5 CM
ECHO LV IVSD: 0.9 CM (ref 0.6–0.9)
ECHO LV MASS 2D: 92.8 G (ref 67–162)
ECHO LV MASS INDEX 2D: 57.6 G/M2 (ref 43–95)
ECHO LV POSTERIOR WALL DIASTOLIC: 0.9 CM (ref 0.6–0.9)
ECHO LV RELATIVE WALL THICKNESS RATIO: 0.5
ECHO LVOT AREA: 3.1 CM2
ECHO LVOT AV VTI INDEX: 0.7
ECHO LVOT DIAM: 2 CM
ECHO LVOT MEAN GRADIENT: 2 MMHG
ECHO LVOT PEAK GRADIENT: 3 MMHG
ECHO LVOT PEAK VELOCITY: 0.9 M/S
ECHO LVOT STROKE VOLUME INDEX: 44.3 ML/M2
ECHO LVOT SV: 71.3 ML
ECHO LVOT VTI: 22.7 CM
ECHO MV A VELOCITY: 0.79 M/S
ECHO MV E DECELERATION TIME (DT): 264 MS
ECHO MV E VELOCITY: 0.85 M/S
ECHO MV E/A RATIO: 1.08
ECHO MV E/E' LATERAL: 13.24
ECHO MV E/E' RATIO (AVERAGED): 15.7
ECHO MV E/E' SEPTAL: 18.16
ECHO RV BASAL DIMENSION: 2.6 CM
ECHO RV FREE WALL PEAK S': 10.1 CM/S

## 2025-08-21 PROCEDURE — 93306 TTE W/DOPPLER COMPLETE: CPT | Performed by: INTERNAL MEDICINE

## 2025-08-21 PROCEDURE — 93306 TTE W/DOPPLER COMPLETE: CPT

## 2025-09-04 DIAGNOSIS — I10 PRIMARY HYPERTENSION: ICD-10-CM

## 2025-09-04 RX ORDER — AMLODIPINE BESYLATE 5 MG/1
5 TABLET ORAL DAILY
Qty: 90 TABLET | Refills: 1 | Status: SHIPPED | OUTPATIENT
Start: 2025-09-04